# Patient Record
Sex: MALE | Employment: FULL TIME | URBAN - NONMETROPOLITAN AREA
[De-identification: names, ages, dates, MRNs, and addresses within clinical notes are randomized per-mention and may not be internally consistent; named-entity substitution may affect disease eponyms.]

---

## 2021-07-15 PROBLEM — M17.11 PRIMARY OSTEOARTHRITIS OF RIGHT KNEE: Status: ACTIVE | Noted: 2020-06-29

## 2021-07-15 PROBLEM — K21.9 GASTROESOPHAGEAL REFLUX DISEASE WITHOUT ESOPHAGITIS: Status: ACTIVE | Noted: 2020-06-30

## 2021-09-02 ENCOUNTER — HOSPITAL ENCOUNTER (OUTPATIENT)
Dept: NEUROLOGY | Age: 56
Discharge: HOME | End: 2021-09-02
Payer: COMMERCIAL

## 2021-09-02 DIAGNOSIS — M25.78 OSTEOPHYTE OF CERVICAL SPINE: ICD-10-CM

## 2021-09-02 PROCEDURE — 95912 NRV CNDJ TEST 11-12 STUDIES: CPT

## 2021-09-02 PROCEDURE — 95886 MUSC TEST DONE W/N TEST COMP: CPT

## 2021-09-02 NOTE — PROGRESS NOTES
EMG/NCS Bilateral    upper Completed    PCP: PAVITHRA Wong - CNP    Ordering:Aurea Angulo APRN-CNP    Interpreting Physician: Annita Manning MD    Technician: Layne Luna RN, RN

## 2021-09-02 NOTE — PROCEDURES
Elizabet Ortega is a 60 y.o. male patient. 1. Osteophyte of cervical spine      Past Medical History:   Diagnosis Date    Seizures ([address]      There were no vitals taken for this visit. Procedures   Electromyography/ Nerve Conduction Study  (EMG/NCV)    Patient Name: Elizabet Ortega   MRN: [id number]  Date of Procedure: 9/2/2021 . Procedure:  Bilateral Upper Extremity EMG /NCV    Chief Complaint::Bilateral  Hand  numbness    Elizabet Ortega is a 60 y.o.  male  Referred  By PAVITHRA Herron CNP   for electrodiagnostic medicine consultation. Patient complains of Bilateral hand  Numbness and weakness . Patient has pain and numbness and lack of feeling in Bilateral  hands  This been going on for about 12 months without any specific injury. Patient has abnormal  sensation in the  Bilateral fingers  Digits 1-4 and slightly  Left 5th finger . Pain does not radiate . There is not history of fractures    Patient does not have Diabetes Mellitus. Patient does not have neck pain, left arm pain nor current pain or weakness in legs. There are new XRays   Cervical XRays ? Normal    Cervical MRI has been  Ordered     Lab     (suggestion)  Information displayed in this report will not trend and will not trigger automated decision support. Ref Range & Units Value   Sodium 134 - 146 mmol/L 144        Potassium, Bld 3.5 - 5.0 mmol/L 4.3        Chloride 98 - 109 mmol/L 109        CO2 22 - 32 mmol/L 27        Anion gap 5 - 15 mmol/L 8    Comment: NEW REFERENCE RANGE       Resulting Agency               Pain Scale:  Pain and numbness reported as 4 out of 10. His present pain began 12 months ago . Pain has not changed since onset. Pain is associated with:numbness     Patient does report numbness. Patient does not report tingling. There is  associated weakness.    Difficulty controlling bowels: No.  Difficulty controlling bladder: No.  Electrical shock sensation in her legs: No.  Difficulty with balance while standing or walking: No.    Patient reports that topical had hadn splints   Worn  Last nightr and Vince  Carpal tunnel injections  Performed  Dr. Ronny Nair, [facility] [place], [place] , yesterday and  States  Left hadn especially is less painful and  num help to alleviate the pain. The following changes pain for the worse: overhead work . The following treatment has been tried:  Physical therapy: No.   Chiropractic treatment: No.  Epidural steroid injection/block: No.   Prescription medications: No.   Over-the-counter medications: Yes. Past Medical History:  Past Medical History:   Diagnosis Date    Seizures ([facility] [address]         Past Surgical History  Past Surgical History:   Procedure Laterality Date    ANTERIOR CRUCIATE LIGAMENT REPAIR      BACK SURGERY      JOINT REPLACEMENT      ROTATOR CUFF REPAIR Bilateral          Family Medical History :  No family history on file. Social History   Social History     Socioeconomic History    Marital status: Unknown     Spouse name: Not on file    Number of children: Not on file    Years of education: Not on file    Highest education level: Not on file   Occupational History    Not on file   Tobacco Use    Smoking status: Never Smoker    Smokeless tobacco: Never Used   Substance and Sexual Activity    Alcohol use: Yes     Comment: occassionally    Drug use: Never    Sexual activity: Not on file   Other Topics Concern    Not on file   Social History Narrative    Not on file     Social Determinants of Health     Financial Resource Strain:     Difficulty of Paying Living Expenses:    Food Insecurity:     Worried About Running Out of Food in the Last Year:     [address] in the Last Year:    Transportation Needs:     Lack of Transportation (Medical):      Lack of Transportation (Non-Medical):    Physical Activity:     Days of Exercise per Week:     Minutes of Exercise per Session:    Stress:     Feeling of Stress :    Social Connections:     Frequency of Communication with Friends and Family:     Frequency of Social Gatherings with Friends and Family:     Attends Faith Services:     Active Member of Clubs or Organizations:     Attends Club or Organization Meetings:     Marital Status:    Intimate Partner Violence:     Fear of Current or Ex-Partner:     Emotionally Abused:     Physically Abused:     Sexually Abused:          Review of Systems  Allergies   Allergen Reactions    Oxycodone-Acetaminophen Other (See Comments) and Rash     Doesn't agree with him. Possible stomach ulcer from it    Codeine Other (See Comments)     Tylenol # 3 doesn't agree with him. Upset stomach     No current outpatient medications on file prior to encounter. No current facility-administered medications on file prior to encounter. Constitutional- no chills fever fatigue  HENT: no congestion   Eyes: no discharge itching  Respiratory: Negative for choking chest tightness, short of breath  Gastrointestinal: no nausea vomiting  Abdominal pain  Musculoskeletal: negative for arthralgias back pain currently  Behavioral: mild anxiety    Physical Exam:   General Appearance:  alert, well appearing, and in no acute distress  Mental status: oriented to person, place, and time with normal affect  Head:  normocephalic, atraumatic. Eye: no icterus, redness, pupils equal and reactive, extraocular eye movements intact, conjunctiva clear  Ear: normal external ear, no discharge, hearing intact  Nose:  no drainage noted  Mouth: mucous membranes moist  Neck: supple, no carotid bruits, thyroid not palpable  Lungs: Bilateral equal air entry, clear to ausculation, no wheezing, rales or rhonchi, normal effort  Cardiovascular: normal rate, regular rhythm, no murmur, gallop, rub.   Abdomen: Soft, nontender, nondistended, normal bowel sounds, no hepatomegaly or splenomegaly  Neurologic: normal muscle tone and bulk, sensory deficits   Mild  Dysesthesias finger tips  1-4 Bilaterally    Strength   Right Handed  $+/5  Left 4+/5  Tender at biceps  Insertion  Painful flexion 4+/5  Mild left Tinels at  Ulnar groove   Neck FROM  Shoulders FROM ,                                                      normal speech, cranial nerves II through XII grossly intact  Musculoskeletal:  No bony deformities  Skin: No gross lesions, rashes, bruising or bleeding on exposed skin area  Extremities:  peripheral pulses palpable, no pedal edema or calf pain with palpation  Psych: normal affect     EMG/NCV Findings:  1) Left  Median Motor Nerve Conduction Distal Latency (DL) is   4.85 ms prolonged compared to  Left Ulnar Sensory Nerve Conduction  DL , normal is less than 1 ms                                       2) Left Median Sensory Nerve Conduction  DL  -  No response at digit 2, and  At digit  V  0,8  ms prolonged compared with Left ulnar sensory nerve conduction  distal latency at digit V      , normal is .5 ms or less                                           3)  Right Median Motor nerve conduction  DL is 4.0 ms prolonged compared with  Right  Ulnar Motor Nerve Conduction, normal is  Less than  1 ms. 4) Right Median Sensory Nerve Conduction DL is absent at digit 2                                           5)  Right  Ulnar Motor  Nerve velocity is slowed 9m/s  Abnormal is slowing 10 m/s or greater                                         6) Left Ulnar Motor Nerve velocity is slowed 9 m/s.  Abnormal is slowing 10 m/s or greater     No cervical Radiculopathy Bilaterally  No Brachial Plexopathy Bilaterally  No other mononeuropathy nor peripheral neuropathy  No evidence of axonal loss                                     Impression:   ABNORMAL STUDIES                          1  Left Carpal Tunnel syndrome affecting  Motor fibers to a Severe Degree                                                                                 affecting Sensory fibers to   Severe degree                            2) Right Carpal Tunnel  Syndrome affecting Motor fibers to a Severe degree                                                                                        Sensory fibers to  A Severe degree                          3 ) Borderline  Left  Ulnar Neuropathy  At Elbow (UNE) ,  Cubital tunnel syndrome                                                   4) Borderline Right  Ulnar Neuropathy At Elbow (UNE) , Cubital tunnel Syndrome               TABLES      Findings of Nerve Conductions: Please see NCV table      Impression: 1 Left  Carpal Tunnel Syndrome                       2) Right Carpal Tunnel Syndrome                      3) Borderline Right Cubital Tunnel Syndrome                      4) Borderline Left  Cubital Tunnel Syndrome                      5) Recent Right Distal Biceps Tendon Tear at  Elbow                      6) History  Of Bilateral Rotator Cuff  Repairs 2016? 7) History of Lumbar Fusion 2008                               Plan: Will  Send Results to  Dr. Valentina Bains, NP in [facility]   Patient told me  He was seen  Yesterday  By  Dr. Ankit Morales[address]  [place], [place]  Gave Bilateral Carpal Ligament Injections and started a on night  Splints and patient has  Less Bilateral Hand Numbness today       Recommend  Neuro or Orthopedic Surgery  Referral ( Dr. Belinda Diamond already has seen patient )  for  Evaluation for     Bilateral Carpal Tunnel Releases           Electronically signed by Lucía Bah MD on 9/2/2021 at 3:13 PM     Lucía Bah MD  9/2/2021 Hide Additional Notes?: No Detail Level: Zone

## 2021-09-02 NOTE — PROCEDURES
EMG Nerve Conduction Study    Patient Name: Mariely Welsh   MRN: [id number]  Date of Procedure: 9/2/2021    Procedure: Bilateral Upper Extremity EMG    . EMG Summary Table     Spontaneous MUAP Recruitment    IA Fib PSW Fasc H.F. Amp Dur. PPP Pattern   R. FIRST D INTEROSS N None None None None N N N N   .                        R. Abd [address] None None None None N N few N   R. Abd Dig Min N None None None None N N N N   R. BICEPS N None None None None N N N N   R. DELTOID N None None None None N N N N   R. TRICEPS N None None None None N N N N   R. CERV PSP (U) N None None None None       R. CERV PSP (M) N None None None None       R. CERV PSP (L) N None None None None       R. Flexor Dig Longus N None None None None       . R. Extensor Dig. Radialis N None None None None       R. Extensor Dig. Ulnaris N None None None None             EMG Summary Table     Spontaneous MUAP Recruitment    IA Fib PSW Fasc H.F. Amp Dur. PPP Pattern   L. FIRST D INTEROSS N None None None None N N N N   .                        L. Abd Poll Brev N None None None None   Few     L. Abd Dig Min N None None None None       L. BICEPS N None None None None N N N N   L. DELTOID N None None None None N N N N   L. TRICEPS N None None None None N N N N   L. CERV PSP (U) N None None None        L. CERV PSP (M) N None None None        L. CERV PSP (L) N None None None        L. Flexor Dig Longus N None None None        . L. Extensor Dig. Radialis N None None None        R. Extensor Dig. Ulnaris N None None None          EMG Summary Table     Spontaneous MUAP Recruitment   .   Electronically signed by Maxwell Andres MD on 9/2/2021 at 3:54 PM

## 2022-09-09 PROBLEM — M25.512 CHRONIC PAIN OF BOTH SHOULDERS: Status: ACTIVE | Noted: 2022-09-09

## 2022-09-09 PROBLEM — M25.511 CHRONIC PAIN OF BOTH SHOULDERS: Status: ACTIVE | Noted: 2022-09-09

## 2022-09-09 PROBLEM — G89.29 CHRONIC PAIN OF BOTH SHOULDERS: Status: ACTIVE | Noted: 2022-09-09

## 2024-12-18 ENCOUNTER — HOSPITAL ENCOUNTER (OUTPATIENT)
Dept: PHYSICAL THERAPY | Age: 59
Setting detail: THERAPIES SERIES
Discharge: HOME | End: 2024-12-18
Payer: COMMERCIAL

## 2024-12-18 PROCEDURE — 97161 PT EVAL LOW COMPLEX 20 MIN: CPT

## 2024-12-18 ASSESSMENT — PAIN DESCRIPTION - PAIN TYPE: TYPE: SURGICAL PAIN

## 2024-12-18 ASSESSMENT — PAIN DESCRIPTION - DESCRIPTORS: DESCRIPTORS: ACHING

## 2024-12-18 ASSESSMENT — PAIN DESCRIPTION - ORIENTATION: ORIENTATION: RIGHT

## 2024-12-18 ASSESSMENT — PAIN SCALES - GENERAL: PAINLEVEL_OUTOF10: 0

## 2024-12-18 ASSESSMENT — PAIN DESCRIPTION - LOCATION: LOCATION: ELBOW

## 2024-12-18 NOTE — PLAN OF CARE
Potential: [] Excellent [x] Good [] Fair  [] Poor     Electronically signed by:  Sid Montoya PT    If you have any questions or concerns, please don't hesitate to call.  Thank you for your referral.      Physician Signature:________________________________Date:__________________  By signing above, therapist’s plan is approved by physician

## 2024-12-18 NOTE — PROGRESS NOTES
Physical Therapy  Initial Assessment  Date: 2024  Patient Name: Manuel Woody  MRN: [id number]  : 1965    Referring Physician: Daniel Farris,*     PCP: Aurea Muhammad APRN - CNP     Medical Diagnosis: Strain of muscle, fascia and tendon of long head of biceps, right arm, initial encounter [S46.111A]    No data recorded    Insurance: Payor: [facility] / Plan: [facility] / Product Type: Indemnity /   Insurance ID: [id number] - ([facility])      Subjective:  General  Chart Reviewed: Yes  Patient Assessed for Rehabilitation Services: Yes  Subjective  Subjective: Patient reporting pulling a gear box and other person pulled it.  Patient noting felt a pop in elbow and shoudler.  Patient had surgery approx 6 weeks ago.  Pain Screening  Patient Currently in Pain: Yes  Pain Assessment: 0-10  Pain Level: 0  Best Pain Level: 0  Worst Pain Level:  (5-10/10 at worst for shoulder, 5/10 elbow)  Pain Type: Surgical pain  Pain Location: Elbow  Pain Orientation: Right  Pain Descriptors: Aching       Vision/Hearing:  Vision  Vision: Within Functional Limits  Hearing  Hearing: Within functional limits    Orientation:  Orientation  Overall Orientation Status: Within Functional Limits      Functional Status:  Functional Status  Occupation: Off due to injury  Type of Occupation: --climbing/lifting/prying/heavy tools.  Prior Level of Assist for ADLs: Independent  Prior Level of Assist for Homemaking: Independent    Objective:          AROM RUE (degrees)  RUE AROM : Exceptions  R Elbow Flexion (0-145): 124  R Elbow Extension (145-0): lack 21  R Forearm Pron (0-90): 88  R Forearm Supination  (0-90): 68  AROM LUE (degrees)  LUE AROM : Exceptions  L Elbow Flexion (0-145): 136  L Elbow Extension (145-0): 0  L Forearm Pron (0-90): 90  L Forearm Supination  (0-90): 80    Strength RUE  Strength RUE: Exception  R Elbow Flexion: 4/5  R Elbow Extension: 4+/5;5/5  R Forearm Pron: 5/5  R Forearm

## 2024-12-18 NOTE — FLOWSHEET NOTE
skill, proprioception.  (04802)    Manual Treatments:    [] Provided manual therapy to mobilize soft tissue/joints for the purpose of modulating pain, promoting relaxation,  increasing ROM, reducing/eliminating soft tissue swelling/inflammation/restriction, improving soft tissue extensibility. (42099)    Service Based Modalities:      Timed Code Treatment Minutes:       Total Treatment Minutes:   40    Treatment/Activity Tolerance:  [x] Patient tolerated treatment well [] Patient limited by fatique  [] Patient limited by pain  [] Patient limited by other medical complications  [] Other:     Prognosis: [x] Good [] Fair  [] Poor    Patient Requires Follow-up: [x] Yes  [] No      Goals:  Short Term Goals  Time Frame for Short Term Goals: 3 weeks  Short Term Goal 1: Initiate HEP    Long Term Goals  Time Frame for Long Term Goals : 6 weeks  Long Term Goal 1: Independent in HEP  Long Term Goal 2: Improve elbow strength to 5/5 to allow ease with strengthening.  Long Term Goal 3: Improve elbow AROM flexion to 130, ext to lack 5  Long Term Goal 4: Improve UEFI to 60/80 or greater to improve overall strength and pain reduction.          Plan:   [] Continue per plan of care [] Alter current plan (see comments)  [x] Plan of care initiated [] Hold pending MD visit [] Discharge  Plan for Next Session:      Electronically signed by:  Sid Montoya PT

## 2024-12-19 ENCOUNTER — HOSPITAL ENCOUNTER (OUTPATIENT)
Dept: PHYSICAL THERAPY | Age: 59
Setting detail: THERAPIES SERIES
Discharge: HOME | End: 2024-12-19
Payer: COMMERCIAL

## 2024-12-19 PROCEDURE — 97110 THERAPEUTIC EXERCISES: CPT | Performed by: PHYSICAL THERAPY ASSISTANT

## 2024-12-19 NOTE — FLOWSHEET NOTE
Physical Therapy Daily Treatment Note    Date:  2024    Patient Name:  Manuel Woody    :  1965  MRN: [id number]  Restrictions/Precautions:   R RTC tear   Medical/Treatment Diagnosis Information:    Strain of muscle, fascia and tendon of long head of biceps, right arm, initial encounter [S46.111A]       Insurance/Certification information:   [facility]   Physician Information:   Daniel Farris MD  Plan of care signed (Y/N):  n  Visit# / total visits:  1/10  Pain level: /10       Time In:348   Time Out:412    Progress Note: []  Yes  [x]  No  Next due by: Visit #10      Subjective:   No pain noted in shoulder or elbow when at rest. Note receiving cortizone shot in shoulder this date. Received ok to perform elbow strengthening    Objective: RAÚL complete per flow chart to facilitate strength, motion and stability. Initiated multiple exercises to improve wrist and elbow strength for work related duties. Patient given squeeze ball for HEP. Shoulder pain noted intermittently throughout session with movement. Discussed attempting to minimize shoulder movement during exercises. Understanding noted.     Observation:   Test measurements:      Exercises:   Exercise/Equipment Resistance/Repetitions Other comments        Elbow AROM flexion/ext  10x 2#     Elbow ext stretch      Wrist flexion/ext  10x    Wrist supination/pronation  10x Hammer   Ball Squeeze 2'     Wrist Bar 10x ea RED (Sup/Pro/Flex/Ext)   Gripper 10x Rubber bands   Triceps Kick out 10x 2# Watch shoulder pain                             [x] Provided verbal/tactile cueing for activities related to strengthening, flexibility, endurance, ROM. (96227)  [] Provided verbal/tactile cueing for activities related to improving balance, coordination, kinesthetic sense, posture, motor skill, proprioception. (18295)    Therapeutic Activities:     [] Therapeutic activities, direct (one-on-one) patient contact (use of dynamic activities to improve

## 2024-12-23 ENCOUNTER — HOSPITAL ENCOUNTER (OUTPATIENT)
Dept: PHYSICAL THERAPY | Age: 59
Setting detail: THERAPIES SERIES
Discharge: HOME | End: 2024-12-23
Payer: COMMERCIAL

## 2024-12-23 PROCEDURE — 97110 THERAPEUTIC EXERCISES: CPT

## 2024-12-23 NOTE — FLOWSHEET NOTE
Physical Therapy Daily Treatment Note    Date:  2024    Patient Name:  Manuel Woody    :  1965  MRN: [id number]  Restrictions/Precautions:   R RTC tear   Medical/Treatment Diagnosis Information:    Strain of muscle, fascia and tendon of long head of biceps, right arm, initial encounter [S46.111A]       Insurance/Certification information:   [facility]   Physician Information:   Daniel Farris MD  Plan of care signed (Y/N):  n  Visit# / total visits:  3/10 - updated   Pain level: /10       Time In:900  Time Out:915    Progress Note: []  Yes  [x]  No  Next due by: Visit #10      Subjective: Reports no pain with exercises, just soreness after last session. Pain in forearm vs elbow with exercises. Noticing relief from Cortizone shot last week.     Objective: RAÚL complete per flow chart to facilitate strength, motion and stability. Initiated multiple exercises to improve wrist and elbow strength for work related duties. Shoulder pain noted intermittently throughout session with movement. Discussed attempting to minimize shoulder movement during exercises. Understanding noted.     Observation:   Test measurements:      Exercises:   Exercise/Equipment Resistance/Repetitions Other comments        Elbow AROM flexion/ext  10x 2#     Elbow ext stretch      Wrist flexion/ext  10x    Wrist supination/pronation  10x Hammer   Ball Squeeze 2'     Wrist Bar 10x ea RED (Sup/Pro/Flex/Ext)   Gripper 15x Rubber bands   Triceps Kick out 10x 2# Watch shoulder pain                             [x] Provided verbal/tactile cueing for activities related to strengthening, flexibility, endurance, ROM. (41343)  [] Provided verbal/tactile cueing for activities related to improving balance, coordination, kinesthetic sense, posture, motor skill, proprioception. (66105)    Therapeutic Activities:     [] Therapeutic activities, direct (one-on-one) patient contact (use of dynamic activities to improve functional

## 2024-12-26 ENCOUNTER — HOSPITAL ENCOUNTER (OUTPATIENT)
Dept: PHYSICAL THERAPY | Age: 59
Setting detail: THERAPIES SERIES
Discharge: HOME | End: 2024-12-26
Payer: COMMERCIAL

## 2024-12-26 PROCEDURE — 97110 THERAPEUTIC EXERCISES: CPT | Performed by: PHYSICAL THERAPY ASSISTANT

## 2024-12-26 NOTE — FLOWSHEET NOTE
Physical Therapy Daily Treatment Note    Date:  2024    Patient Name:  Manuel Woody    :  1965  MRN: [id number]  Restrictions/Precautions:   R RTC tear   Medical/Treatment Diagnosis Information:    Strain of muscle, fascia and tendon of long head of biceps, right arm, initial encounter [S46.111A]       Insurance/Certification information:   [facility]   Physician Information:   Daniel Farris MD  Plan of care signed (Y/N):  n  Visit# / total visits:  4/10 - updated   Pain level: /10       Time In:09  Time Out:1025    Progress Note: []  Yes  [x]  No  Next due by: Visit #10      Subjective: No pain in elbow, ongoing pain in shoulder noted. Notes feeling overall improvement in elbow.     Objective: RAÚL complete per flow chart to facilitate strength, motion and stability. Initiated multiple exercises to improve wrist and elbow strength for work related duties. Shoulder pain noted intermittently throughout session with movement. Discussed attempting to minimize shoulder movement during exercises. Understanding noted.     Observation:   Test measurements:  °    Exercises:   Exercise/Equipment Resistance/Repetitions Other comments        Elbow Ext     Elbow AROM flexion 10x 2#  3-way   Elbow ext stretch      Wrist flexion/ext  10x    Wrist supination/pronation  10x Hammer   Ball Squeeze 2'     Wrist Bar 15x ea RED (Sup/Pro/Flex/Ext)   Gripper 15x Rubber bands   Triceps Kick out 10x 2# Watch shoulder pain                   PROM Flex/Ext 8'         [x] Provided verbal/tactile cueing for activities related to strengthening, flexibility, endurance, ROM. (87713)  [] Provided verbal/tactile cueing for activities related to improving balance, coordination, kinesthetic sense, posture, motor skill, proprioception. (32496)    Therapeutic Activities:     [] Therapeutic activities, direct (one-on-one) patient contact (use of dynamic activities to improve functional performance). (81360)    Gait:

## 2024-12-27 ENCOUNTER — HOSPITAL ENCOUNTER (OUTPATIENT)
Dept: PHYSICAL THERAPY | Age: 59
Setting detail: THERAPIES SERIES
Discharge: HOME | End: 2024-12-27
Payer: COMMERCIAL

## 2024-12-27 PROCEDURE — 97110 THERAPEUTIC EXERCISES: CPT | Performed by: PHYSICAL THERAPY ASSISTANT

## 2024-12-27 NOTE — FLOWSHEET NOTE
Physical Therapy Daily Treatment Note    Date:  2024    Patient Name:  Manuel Woody    :  1965  MRN: [id number]  Restrictions/Precautions:   R RTC tear   Medical/Treatment Diagnosis Information:    Strain of muscle, fascia and tendon of long head of biceps, right arm, initial encounter [S46.111A]       Insurance/Certification information:   [facility]   Physician Information:   Daniel Farris MD  Plan of care signed (Y/N):  n  Visit# / total visits:  4/10 - updated   Pain level: /10       Time In:1005  Time Out:1033    Progress Note: []  Yes  [x]  No  Next due by: Visit #10      Subjective: No pain in elbow, ongoing pain in shoulder noted. Soreness through forearm noted.     Objective: RAÚL complete per flow chart to facilitate strength, motion and stability. Verbal cuing for proper technique and order of exercises. Manual PROM to elbow flexion and extension to improve motion. Tight end range noted. Shoulder pain noted intermittently throughout session with movement.     Observation:   Test measurements:      Exercises:   Exercise/Equipment Resistance/Repetitions Other comments        Elbow Ext     Elbow AROM flexion 10x 2#  3-way   Elbow ext stretch      Wrist flexion/ext  10x    Wrist supination/pronation  15x Hammer   Ball Squeeze 2'     Wrist Bar 15x ea RED (Sup/Pro/Flex/Ext)   Gripper 15x Rubber bands   Triceps Kick out 10x 2# Watch shoulder pain                   PROM Flex/Ext 8'         [x] Provided verbal/tactile cueing for activities related to strengthening, flexibility, endurance, ROM. (11602)  [] Provided verbal/tactile cueing for activities related to improving balance, coordination, kinesthetic sense, posture, motor skill, proprioception. (95096)    Therapeutic Activities:     [] Therapeutic activities, direct (one-on-one) patient contact (use of dynamic activities to improve functional performance). (83897)    Gait:   [] Provided training and instruction to the patient

## 2024-12-30 ENCOUNTER — HOSPITAL ENCOUNTER (OUTPATIENT)
Dept: PHYSICAL THERAPY | Age: 59
Setting detail: THERAPIES SERIES
Discharge: HOME | End: 2024-12-30
Payer: COMMERCIAL

## 2024-12-30 PROCEDURE — 97110 THERAPEUTIC EXERCISES: CPT

## 2024-12-30 NOTE — FLOWSHEET NOTE
Physical Therapy Daily Treatment Note    Date:  2024    Patient Name:  Manuel Woody    :  1965  MRN: [id number]  Restrictions/Precautions:   R RTC tear   Medical/Treatment Diagnosis Information:    Strain of muscle, fascia and tendon of long head of biceps, right arm, initial encounter [S46.111A]       Insurance/Certification information:   [facility]   Physician Information:   Daniel Farris MD  Plan of care signed (Y/N):  n  Visit# / total visits:  5/10 - updated   Pain level: /10       Time In:     1:16  Time Out:    1:44    Progress Note: []  Yes  [x]  No  Next due by: Visit #10      Subjective: No pain in elbow, ongoing pain in shoulder with certain movements. Feels a catch in his shoulder at times that \"feels like muscles rubbing against each other\".    Objective: RAÚL complete per flow chart to facilitate strength, motion and stability. Verbal cuing for proper technique and order of exercises. Manual PROM to elbow flexion and extension to improve motion. Tight end range noted. Shoulder pain noted intermittently throughout session with movement.     Observation:   Test measurements:      Exercises:   Exercise/Equipment Resistance/Repetitions Other comments        Elbow Ext     Elbow AROM flexion 10x 2#  3-way   Elbow ext stretch      Wrist flex/ext  10x    Wrist supination/pronation  15x Hammer   Ball Squeeze 2'     Wrist Bar 15x ea RED (Sup/Pro/Flex/Ext)   Gripper 15x Rubber bands   Triceps Kick out 10x 2# Watch shoulder pain                   PROM Flex/Ext 8'         [x] Provided verbal/tactile cueing for activities related to strengthening, flexibility, endurance, ROM. (01029)  [] Provided verbal/tactile cueing for activities related to improving balance, coordination, kinesthetic sense, posture, motor skill, proprioception. (43117)    Therapeutic Activities:     [] Therapeutic activities, direct (one-on-one) patient contact (use of dynamic activities to improve functional

## 2025-01-03 ENCOUNTER — HOSPITAL ENCOUNTER (OUTPATIENT)
Dept: PHYSICAL THERAPY | Age: 60
Setting detail: THERAPIES SERIES
Discharge: HOME | End: 2025-01-03
Payer: COMMERCIAL

## 2025-01-03 PROCEDURE — 97110 THERAPEUTIC EXERCISES: CPT | Performed by: PHYSICAL THERAPY ASSISTANT

## 2025-01-03 NOTE — FLOWSHEET NOTE
Physical Therapy Daily Treatment Note    Date:  1/3/2025    Patient Name:  Manuel Woody    :  1965  MRN: [id number]  Restrictions/Precautions:   R RTC tear   Medical/Treatment Diagnosis Information:    Strain of muscle, fascia and tendon of long head of biceps, right arm, initial encounter [S46.111A]       Insurance/Certification information:   [facility]   Physician Information:   Daniel Farris MD  Plan of care signed (Y/N):  n  Visit# / total visits:  7/10 -   Pain level: /10       Time In:  1006  Time Out:   1033    Progress Note: []  Yes  [x]  No  Next due by: Visit #10  2025    Subjective: No pain noted in elbow.    Objective: RAÚL complete per flow chart to facilitate strength, motion and stability. Verbal cuing for proper technique and order of exercises. Manual PROM to elbow flexion and extension to improve motion. Tight end range noted. Shoulder pain noted intermittently throughout session with movement.     Observation:   Test measurements:  AROM   AAROM 5 elbow ext    Exercises:   Exercise/Equipment Resistance/Repetitions Other comments        Elbow Ext     Elbow AROM flexion 15x 2#  3-way   Elbow ext stretch      Wrist flex/ext  10x 2#   Wrist supination/pronation  15x Hammer   Ball Squeeze 2'     Wrist Bar 15x ea RED (Sup/Pro/Flex/Ext)   Gripper 20x Rubber bands   Triceps Kick out 10x 2# Watch shoulder pain                   PROM Flex/Ext 8'         [x] Provided verbal/tactile cueing for activities related to strengthening, flexibility, endurance, ROM. (82797)  [] Provided verbal/tactile cueing for activities related to improving balance, coordination, kinesthetic sense, posture, motor skill, proprioception. (84256)    Therapeutic Activities:     [] Therapeutic activities, direct (one-on-one) patient contact (use of dynamic activities to improve functional performance). (17276)    Gait:   [] Provided training and instruction to the patient for ambulation

## 2025-01-06 ENCOUNTER — HOSPITAL ENCOUNTER (OUTPATIENT)
Dept: PHYSICAL THERAPY | Age: 60
Setting detail: THERAPIES SERIES
Discharge: HOME | End: 2025-01-06
Payer: COMMERCIAL

## 2025-01-06 PROCEDURE — 97110 THERAPEUTIC EXERCISES: CPT

## 2025-01-08 ENCOUNTER — APPOINTMENT (OUTPATIENT)
Dept: PHYSICAL THERAPY | Age: 60
End: 2025-01-08
Payer: COMMERCIAL

## 2025-01-10 ENCOUNTER — HOSPITAL ENCOUNTER (OUTPATIENT)
Dept: PHYSICAL THERAPY | Age: 60
Setting detail: THERAPIES SERIES
Discharge: HOME | End: 2025-01-10
Payer: COMMERCIAL

## 2025-01-10 PROCEDURE — 97161 PT EVAL LOW COMPLEX 20 MIN: CPT

## 2025-01-10 ASSESSMENT — PAIN DESCRIPTION - DESCRIPTORS: DESCRIPTORS: SHARP

## 2025-01-10 ASSESSMENT — PAIN DESCRIPTION - LOCATION: LOCATION: SHOULDER

## 2025-01-10 ASSESSMENT — PAIN DESCRIPTION - ORIENTATION: ORIENTATION: RIGHT

## 2025-01-10 ASSESSMENT — PAIN SCALES - GENERAL: PAINLEVEL_OUTOF10: 0

## 2025-01-10 NOTE — FLOWSHEET NOTE
Physical Therapy Daily Treatment Note    Date:  1/10/2025    Patient Name:  Manuel Woody    :  1965  MRN: [id number]  Restrictions/Precautions:     Medical/Treatment Diagnosis Information:    Strain of muscle, fascia and tendon of long head of biceps, right arm, initial encounter [S46.111A]  Other specified injuries of right shoulder and upper arm, initial encounter [S49.81XA]    Insurance/Certification information:   [facility]   Physician Information:    Taz Loya MD   Plan of care signed (Y/N):  n  Visit# / total visits: 1 /10  Pain level: 0/10, at worst 8/10       Time In: 1105  Time Out:1140    Progress Note: [x]  Yes  []  No  Next due by: Visit #10  Or by    Subjective:   see eval    Objective: see eval  Observation:   Test measurements:      Exercises: Follow Elbow Restrictions  Exercise/Equipment Resistance/Repetitions Other comments   Pulley's           Shoulder ext stretch      Shoulder IR stretch     4 way shoulder Band      Finger ladder      Wall slides     Table slides     Valpar      Shoulder ABC's      Shoulder Circles     Shoulder flexion/Abd      Supine pex stretch     SA punches/ABC     SL ER/abd     Prone cuff series          PROM IR        [] Provided verbal/tactile cueing for activities related to strengthening, flexibility, endurance, ROM. (33137)  [] Provided verbal/tactile cueing for activities related to improving balance, coordination, kinesthetic sense, posture, motor skill, proprioception. (93976)    Therapeutic Activities:     [] Therapeutic activities, direct (one-on-one) patient contact (use of dynamic activities to improve functional performance). (62017)    Gait:   [] Provided training and instruction to the patient for ambulation re-education. (22383)    Self-Care/ADL's  [] Self-care/home management training and compensatory training, meal preparation, safety procedures, and instructions in use of assistive technology devices/adaptive equipment, direct

## 2025-01-10 NOTE — PROGRESS NOTES
Physical Therapy  Initial Assessment  Date: 1/10/2025  Patient Name: Manuel Woody  MRN: [id number]  : 1965    Referring Physician: Taz Loya MD     PCP: Aurea Muhammad APRN - CNP     Medical Diagnosis: Strain of muscle, fascia and tendon of long head of biceps, right arm, initial encounter [S46.111A]  Other specified injuries of right shoulder and upper arm, initial encounter [S49.81XA]    No data recorded    Insurance: Payor: [place] [place] / Plan: [facility] / Product Type: Indemnity /   Insurance ID: [id number] - ([facility])    Subjective:  General  Chart Reviewed: Yes  Patient Assessed for Rehabilitation Services: Yes  General  General Comments: Patient noting reaching above and placing an item with increased pain.  Decreased AROM bakari wtih IR.  Subjective  Subjective: Patient reporting pulling a gear box and other person pulled it.  Patient noting felt a pop in elbow and shoudler.  Patient had Elbow surgery surgery 25.  [place]oulder injury at the same time.  MRI of the shoulder.  Tears in the Rotator Cuff.  Per patient account  States normally doesn't do surgery unless we have too.  Patient noting at last appt,  gave Cortisone injection, less pain and increased AROM.  PAtient noting decreased strength.  Pain Screening  Pain Assessment: 0-10  Pain Level: 0  Worst Pain Level: 8  Pain Location: Shoulder  Pain Orientation: Right  Pain Descriptors: Sharp       Vision/Hearing:  Vision  Vision: Within Functional Limits  Hearing  Hearing: Within functional limits    Functional Status:  Functional Status  Occupation: Off due to injury  Type of Occupation: --climbing/lifting/prying/heavy tools.  Prior Level of Assist for ADLs: Independent  Prior Level of Assist for Homemaking: Independent    Objective:          AROM RUE (degrees)  RUE AROM : Exceptions  R Shoulder Flexion (0-180): 139  R Shoulder Extension (0-45): 58  R Shoulder ABduction (0-180): 142  R Shoulder Int  Pt has hx of non compliance Recommend SLT OU R&B reviewed. Pt agrees to proceed.  Understands he will still continue Latanoprost.

## 2025-01-10 NOTE — PLAN OF CARE
days   [] 3 weeks [] 7 weeks     [] 4 days   [] 4 weeks [] 8 weeks    Rehab Potential: [] Excellent [x] Good [] Fair  [] Poor     Electronically signed by:  Sid Montoya PT    If you have any questions or concerns, please don't hesitate to call.  Thank you for your referral.      Physician Signature:________________________________Date:__________________  By signing above, therapist’s plan is approved by physician

## 2025-01-13 ENCOUNTER — HOSPITAL ENCOUNTER (OUTPATIENT)
Dept: PHYSICAL THERAPY | Age: 60
Setting detail: THERAPIES SERIES
Discharge: HOME | End: 2025-01-13
Payer: COMMERCIAL

## 2025-01-13 PROCEDURE — 97110 THERAPEUTIC EXERCISES: CPT

## 2025-01-13 NOTE — FLOWSHEET NOTE
Physical Therapy Daily Treatment Note    Date:  2025    Patient Name:  Manuel Woody    :  1965  MRN: [id number]  Restrictions/Precautions:     Medical/Treatment Diagnosis Information:    Strain of muscle, fascia and tendon of long head of biceps, right arm, initial encounter [S46.111A]  Other specified injuries of right shoulder and upper arm, initial encounter [S49.81XA]    Insurance/Certification information:   [facility]   Physician Information:    Taz Loya MD   Plan of care signed (Y/N):  n  Visit# / total visits: 2/10  Pain level: 0/10, at worst 8/10       Time In:    11:12  Time Out:   11:40    Progress Note: []  Yes  [x]  No  Next due by: Visit #10  Or by    Subjective:   Pt reports has no pain in R shoulder at rest. Pain increases to moderate when he flexes shoulder past shoulder height. Pain is worse when trying to lift something a little heavier above shoulder height.    Objective: There ex performed per flowsheet to increase R shoulder mobility and strength for improved ability to complete ADLs and to reduce pain. Verbal cueing for sequencing and proper form with ex. Pt noted mild pain with R shoulder flex and abd.     Observation:   Test measurements:      Exercises: Follow Elbow Restrictions  Exercise/Equipment Resistance/Repetitions Other comments   Pulley's  2' each Flex, scap        Shoulder ext stretch  30\"x2 wand   Shoulder IR stretch 30\"x2 strap   4 way shoulder Band  10x red    Finger ladder  5x each Flex, abd   Wall slides     Table slides     Valpar      Shoulder ABC's  1x Standing, at 90 deg flex   Shoulder Circles 10x    Shoulder flexion/Abd      Supine pex stretch     SA punches/ABC 10x/1x    SL ER/abd     Prone cuff series          PROM IR  5'      [] Provided verbal/tactile cueing for activities related to strengthening, flexibility, endurance, ROM. (19013)  [] Provided verbal/tactile cueing for activities related to improving balance, coordination, kinesthetic

## 2025-01-13 NOTE — FLOWSHEET NOTE
Physical Therapy Daily Treatment Note    Date:  2025    Patient Name:  Manuel Woody    :  1965  MRN: [id number]  Restrictions/Precautions:   R RTC tear   Medical/Treatment Diagnosis Information:    Strain of muscle, fascia and tendon of long head of biceps, right arm, initial encounter [S46.111A]       Insurance/Certification information:   [facility]   Physician Information:   Daniel Farris MD  Plan of care signed (Y/N):  n  Visit# / total visits:  9/10 -   Pain level: /10       Time In:       10:33  Time Out:       11:11    Progress Note: [x]  Yes  []  No  Next due by: Visit #10  2025    Subjective: No pain noted in elbow. Has some pain/discomfort across forearm.    Objective: RAÚL complete per flow chart to facilitate strength, motion and stability. Verbal cuing for proper technique and order of exercises. Manual PROM to elbow flexion and extension to improve motion. Tight end range noted with flexion. Advanced exercises for strengthening.    Observation:   Test measurements:  AROM 5-140 degrees  Flexion 4+/5, extension 5/5    Exercises:   Exercise/Equipment Resistance/Repetitions Other comments        Elbow Ext     Elbow AROM flexion 15x 4#  3-way   Elbow ext stretch      Wrist flex/ext  10x 3#    Wrist supination/pronation  20x Hammer   Ball Squeeze 2'     Wrist Bar 15x ea RED (Sup/Pro/Flex/Ext)   Gripper 20x Rubber bands   Triceps Kick out 10x 3# Watch shoulder pain    Wrist roller (1# on string) 2x each Flex, ext, 1# weight              PROM Flex/Ext 8'         [x] Provided verbal/tactile cueing for activities related to strengthening, flexibility, endurance, ROM. (08183)  [] Provided verbal/tactile cueing for activities related to improving balance, coordination, kinesthetic sense, posture, motor skill, proprioception. (86647)    Therapeutic Activities:     [] Therapeutic activities, direct (one-on-one) patient contact (use of dynamic activities to improve functional

## 2025-01-15 ENCOUNTER — APPOINTMENT (OUTPATIENT)
Dept: PHYSICAL THERAPY | Age: 60
End: 2025-01-15
Payer: COMMERCIAL

## 2025-01-15 ENCOUNTER — HOSPITAL ENCOUNTER (OUTPATIENT)
Dept: PHYSICAL THERAPY | Age: 60
Setting detail: THERAPIES SERIES
Discharge: HOME | End: 2025-01-15
Payer: COMMERCIAL

## 2025-01-15 PROCEDURE — 97110 THERAPEUTIC EXERCISES: CPT | Performed by: PHYSICAL THERAPY ASSISTANT

## 2025-01-15 NOTE — FLOWSHEET NOTE
Physical Therapy Daily Treatment Note    Date:  1/15/2025    Patient Name:  Manuel Woody    :  1965  MRN: [id number]  Restrictions/Precautions:     Medical/Treatment Diagnosis Information:    Strain of muscle, fascia and tendon of long head of biceps, right arm, initial encounter [S46.111A]  Other specified injuries of right shoulder and upper arm, initial encounter [S49.81XA]    Insurance/Certification information:   [facility]   Physician Information:    Taz Loya MD   Plan of care signed (Y/N):  n  Visit# / total visits: 2/10  Pain level: 0/10       Time In:    1140  Time Out:  1212    Progress Note: []  Yes  [x]  No  Next due by: Visit #10  Or by    Subjective:   Pt reports has no pain in R shoulder at rest. Notes discomfort in forearm only this date.     Objective: There ex performed per flowsheet to increase R shoulder mobility and strength for improved ability to complete ADLs and to reduce pain. Verbal cueing for sequencing and proper form with ex. Fatigue and discomfort noted in shoulder with prolonged activity. Initiated and advanced several exercises to improve motion and strength.     Observation:   Test measurements:      Exercises: Follow Elbow Restrictions  Exercise/Equipment Resistance/Repetitions Other comments   Pulley's  2' each Flex, scap        Shoulder ext stretch  30\"x2 wand   Shoulder IR stretch 30\"x2 strap   4 way shoulder Band  10x red    Finger ladder  5x each Flex, abd   Wall slides     Table slides     Valpar      Shoulder ABC's  1x Standing, at 90 deg flex   Shoulder Circles 10x    Shoulder flexion/Abd      Supine pex stretch     SA punches/ABC 10x/1x    SL ER/abd 10x ea 2#   Prone cuff series          PROM IR  5'      [] Provided verbal/tactile cueing for activities related to strengthening, flexibility, endurance, ROM. (71404)  [] Provided verbal/tactile cueing for activities related to improving balance, coordination, kinesthetic sense, posture, motor skill,

## 2025-01-16 ENCOUNTER — HOSPITAL ENCOUNTER (OUTPATIENT)
Dept: PHYSICAL THERAPY | Age: 60
Setting detail: THERAPIES SERIES
Discharge: HOME | End: 2025-01-16
Payer: COMMERCIAL

## 2025-01-16 PROCEDURE — 97110 THERAPEUTIC EXERCISES: CPT | Performed by: PHYSICAL THERAPY ASSISTANT

## 2025-01-16 PROCEDURE — 97110 THERAPEUTIC EXERCISES: CPT

## 2025-01-16 NOTE — FLOWSHEET NOTE
Physical Therapy Daily Treatment Note    Date:  2025    Patient Name:  Manuel Woody    :  1965  MRN: [id number]  Restrictions/Precautions:     Medical/Treatment Diagnosis Information:    Strain of muscle, fascia and tendon of long head of biceps, right arm, initial encounter [S46.111A]  Other specified injuries of right shoulder and upper arm, initial encounter [S49.81XA]    Insurance/Certification information:   [facility]   Physician Information:    Taz Loya MD   Plan of care signed (Y/N):  n  Visit# / total visits: 3/10  Pain level: 0/10       Time In:   1203  Time Out:1245    Progress Note: []  Yes  [x]  No  Next due by: Visit #10  Or by    Subjective:   Increased ease with donning shirts. Tightness and discomfort noted.     Objective: There ex performed per flowsheet to increase R shoulder mobility and strength for improved ability to complete ADLs and to reduce pain. Verbal cueing for sequencing and proper form with ex. Min fatigue and discomfort noted in shoulder with prolonged activity remain. Initiated and advanced several exercises to improve motion and strength.     Observation:   Test measurements:      Exercises: Follow Elbow Restrictions  Exercise/Equipment Resistance/Repetitions Other comments   Pulley's  2' each Flex, scap        Shoulder ext stretch  10x5'' wand   Shoulder IR stretch 10x10'' strap   4 way shoulder Band  15x red    Finger ladder  5x each Flex, abd   Counter push ups 10x Initiated        Wall slides     Table slides     Valpar      Shoulder ABC's  1x Standing, at 90 deg flex   Shoulder Circles 10x    Shoulder flexion/Abd      Supine pex stretch     SA punches/ABC 10x/1x 2#   SL ER/abd 10x ea 2#   Prone cuff series          PROM IR  5'      [x] Provided verbal/tactile cueing for activities related to strengthening, flexibility, endurance, ROM. (26703)  [] Provided verbal/tactile cueing for activities related to improving balance, coordination, kinesthetic

## 2025-01-16 NOTE — FLOWSHEET NOTE
Physical Therapy Daily Treatment Note    Date:  2025    Patient Name:  Manuel Woody    :  1965  MRN: [id number]  Restrictions/Precautions:   R RTC tear   Medical/Treatment Diagnosis Information:    Strain of muscle, fascia and tendon of long head of biceps, right arm, initial encounter [S46.111A]       Insurance/Certification information:   [facility]   Physician Information:   Daniel Farris MD  Plan of care signed (Y/N):  n  Visit# / total visits:  10/10 -   Pain level: /10       Time In:    1133  Time Out:    1203    Progress Note: [x]  Yes  []  No  Next due by: Visit #10  2025    Subjective: No pain noted in elbow. Has some pain/discomfort across forearm, with wrist extension.  Patient noting pushing up from the chair and can feel it difficulty with opening a jar.    Objective: RAÚL complete per flow chart to facilitate strength, motion and stability. Verbal cuing for proper technique and order of exercises. Manual PROM to elbow flexion and extension to improve motion. Tight end range noted with flexion. Advanced exercises for strengthening.    Observation:   Test measurements:  AROM 5-141 degrees  Flexion 4+/5, extension 5/5    Exercises:   Exercise/Equipment Resistance/Repetitions Other comments        Elbow Ext     Elbow AROM flexion 15x 4#  3-way   Elbow ext stretch      Wrist flex/ext  10x 3#    Wrist supination/pronation  readd Hammer   Ball Squeeze 2'     Wrist Bar 15x ea Blue (Sup/Pro/Flex/Ext)   Gripper readd Rubber bands   Triceps Kick out 15x 4# Watch shoulder pain    Wrist roller (4# on string) 2x each Flex, ext, 1# weight    Wall Pushups     Shoudler flexion-placement onto a shelf      PROM Flex/Ext 8'         [x] Provided verbal/tactile cueing for activities related to strengthening, flexibility, endurance, ROM. (45246)  [] Provided verbal/tactile cueing for activities related to improving balance, coordination, kinesthetic sense, posture, motor skill,

## 2025-01-16 NOTE — PLAN OF CARE
[facility]  Rehabilitation and Sports Medicine    [x] [place]  Phone: [phone]  Fax: [phone]      [] [place]  Phone: [phone]  Fax: [phone]    Physical Therapy Progress Note  Date: 2025        Patient Name:  Manuel Woody    :  1965  MRN: [id number]  Restrictions/Precautions:   R RTC tear   Medical/Treatment Diagnosis Information:    Strain of muscle, fascia and tendon of long head of biceps, right arm, initial encounter [S46.111A]      Insurance/Certification information:   [facility]   Physician Information:   Daniel Farris MD  Plan of care signed (Y/N):  n  Visit# / total visits:  10/10 -   Pain level:      /10      Time Period for Report:  24-25  Cancels/No-shows to date:  0    Plan of Care/Treatment to date:  [x] Therapeutic Exercise    [x] Modalities:  [x] Therapeutic Activity     [] Ultrasound  [] Electrical Stimulation  [] Gait Training      [] Cervical Traction    [] Lumbar Traction  [x] Neuromuscular Re-education  [] Cold/hotpack [] Iontophoresis  [x] Instruction in HEP      Other:  [x] Manual Therapy       []    [] Aquatic Therapy       []                           Subjective:   No pain noted in elbow. Has some pain/discomfort across forearm, with wrist extension.  Patient noting pushing up from the chair and can feel it difficulty with opening a jar.     Objective: RAÚL complete per flow chart to facilitate strength, motion and stability. Verbal cuing for proper technique and order of exercises. Manual PROM to elbow flexion and extension to improve motion. Tight end range noted with flexion. Advanced exercises for strengthening.     Observation:   Test measurements:  AROM 5-141 degrees  Flexion 4+/5, extension 5/5       Assessment:  Patient progressing towards all goals.  Shoulder limits him with most exs.  Patient now has an order for his R shoulder.  Progressing biceps and wrist in one session and shoulder session full

## 2025-01-20 ENCOUNTER — HOSPITAL ENCOUNTER (OUTPATIENT)
Dept: PHYSICAL THERAPY | Age: 60
Setting detail: THERAPIES SERIES
Discharge: HOME | End: 2025-01-20
Payer: COMMERCIAL

## 2025-01-20 PROCEDURE — 97110 THERAPEUTIC EXERCISES: CPT | Performed by: PHYSICAL THERAPY ASSISTANT

## 2025-01-20 NOTE — FLOWSHEET NOTE
Physical Therapy Daily Treatment Note    Date:  2025    Patient Name:  Manuel Woody    :  1965  MRN: [id number]  Restrictions/Precautions:   R RTC tear   Medical/Treatment Diagnosis Information:    Strain of muscle, fascia and tendon of long head of biceps, right arm, initial encounter [S46.111A]       Insurance/Certification information:   [facility]   Physician Information:   Daniel Farris MD  Plan of care signed (Y/N):  n  Visit# / total visits:  10/10 -   Pain level: /10       Time In: 1219  Time Out:   1050    Progress Note: []  Yes  []  No  Next due by: Visit #10  2025    Subjective: No pain noted in elbow. Has some pain/discomfort across forearm, with wrist extension.  Pain 0/10 at initiation of session, increases to 9/10 with wrist ext. Notes difficulty opening jars at home.   .   Objective: RAÚL complete per flow chart to facilitate strength, motion and stability. Verbal cuing for proper technique and order of exercises. Held / altered several exercises due to pain. Manual PROM to elbow flexion and extension to improve motion. Tight end range noted with flexion.     Observation:   Test measurements:   strength R/L: 75#/110#    Exercises:   Exercise/Equipment Resistance/Repetitions Other comments        Elbow Ext     Elbow AROM flexion 15x 4#  3-way   Elbow ext stretch      Wrist flex/ext  10x 3# Flexion only, pain with extension.    Wrist supination/pronation  20x   Hammer   Ball Squeeze 2'     Wrist Bar 10x ea Blue (Sup/Pro/Flex/)   Gripper 10x Rubber bands   Triceps Kick out 15x 4# Watch shoulder pain    Wrist roller (4# on string) 2x each Flex, ext, 1# weight              PROM Flex/Ext 8'         [x] Provided verbal/tactile cueing for activities related to strengthening, flexibility, endurance, ROM. (63458)  [] Provided verbal/tactile cueing for activities related to improving balance, coordination, kinesthetic sense, posture, motor skill, proprioception.

## 2025-01-20 NOTE — FLOWSHEET NOTE
Physical Therapy Daily Treatment Note    Date:  2025    Patient Name:  Manuel Woody    :  1965  MRN: [id number]  Restrictions/Precautions:     Medical/Treatment Diagnosis Information:    Strain of muscle, fascia and tendon of long head of biceps, right arm, initial encounter [S46.111A]  Other specified injuries of right shoulder and upper arm, initial encounter [S49.81XA]    Insurance/Certification information:   [facility]   Physician Information:    Taz Loya MD   Plan of care signed (Y/N):  n  Visit# / total visits: 4/10  Pain level: 0/10       Time In:   1050  Time Out:1130    Progress Note: []  Yes  [x]  No  Next due by: Visit #10  Or by    Subjective:   Increased ease with donning shirts. Tightness and discomfort noted. Increased pain when attempting to use drill overhead over weekend.     Objective: There ex performed per flowsheet to increase R shoulder mobility and strength for improved ability to complete ADLs and to reduce pain. Verbal cueing for sequencing and proper form with ex. Min fatigue and discomfort noted in shoulder with prolonged activity remain.     Observation: Anterior shoulder pain noted with prolonged use and stretching    Test measurements:      Exercises: Follow Elbow Restrictions  Exercise/Equipment Resistance/Repetitions Other comments   Pulley's  2' each Flex, scap        Shoulder ext stretch  10x5'' wand   Shoulder IR stretch 10x10'' strap   4 way shoulder Band  15x red    Finger ladder  5x each Flex, abd   Counter push ups 10x Initiated        Wall slides     Table slides     Valpar      Shoulder ABC's  1x Standing, at 90 deg flex   Shoulder Circles 10x    Shoulder flexion/Abd      Supine pex stretch     SA punches/ABC 15x/1x 2#   SL ER/abd 10x ea 2#   Prone cuff series          PROM IR        [x] Provided verbal/tactile cueing for activities related to strengthening, flexibility, endurance, ROM. (50904)  [] Provided verbal/tactile cueing for activities

## 2025-01-22 ENCOUNTER — HOSPITAL ENCOUNTER (OUTPATIENT)
Dept: PHYSICAL THERAPY | Age: 60
Setting detail: THERAPIES SERIES
Discharge: HOME | End: 2025-01-22
Payer: COMMERCIAL

## 2025-01-22 PROCEDURE — 97110 THERAPEUTIC EXERCISES: CPT | Performed by: PHYSICAL THERAPIST

## 2025-01-22 NOTE — FLOWSHEET NOTE
Physical Therapy Daily Treatment Note    Date:  2025    Patient Name:  Manuel Woody    :  1965  MRN: [id number]  Restrictions/Precautions:   R RTC tear   Medical/Treatment Diagnosis Information:    Strain of muscle, fascia and tendon of long head of biceps, right arm, initial encounter [S46.111A]       [facility]Cert[facility]   Physician Information:   Daniel Farris MD  Plan of care signed (Y/N):  n  Visit# / total visits:  2/10 of  POC  12 visits total  Pain level: /10       Time In: 11:02  Time Out:  11:38    Progress Note: []  Yes  []  No  Next due by: Visit #10  2025    Subjective: \"The shoulder itself hurts more than the elbow. I felt some increased pain while I was trying to put dishes away this morning. The elbow has very little pain this morning.\"  .   Objective: RAÚL complete per flow chart to facilitate strength, motion and stability. Verbal cuing for proper technique and order of exercises.  Manual PROM to elbow flexion and extension to improve motion. Tight end range noted with flexion.     Observation:   Test measurements:    AROM R elbow extension: 3°  flex: 140°    Exercises:   Exercise/Equipment Resistance/Repetitions Other comments        Elbow Ext     Elbow AROM flexion 20x 4#  3-way   Elbow ext stretch      Wrist flex/ext  10x 3# Flexion only, pain with extension.    Wrist supination/pronation  20x   Hammer   Ball Squeeze 2'     Wrist Bar 15x ea Blue (Sup/Pro/Flex/)   Gripper 15x Rubber bands   Triceps Kick out 15x 4# Watch shoulder pain    Wrist roller (1# on string) 5x each Flex, ext, 1# weight    Wrist Maze 3x         PROM Flex/Ext 6'         [x] Provided verbal/tactile cueing for activities related to strengthening, flexibility, endurance, ROM. (70948)  [] Provided verbal/tactile cueing for activities related to improving balance, coordination, kinesthetic sense, posture, motor skill, proprioception. (31862)    Therapeutic

## 2025-01-24 ENCOUNTER — HOSPITAL ENCOUNTER (OUTPATIENT)
Dept: PHYSICAL THERAPY | Age: 60
Setting detail: THERAPIES SERIES
Discharge: HOME | End: 2025-01-24
Payer: COMMERCIAL

## 2025-01-24 PROCEDURE — 97110 THERAPEUTIC EXERCISES: CPT

## 2025-01-24 NOTE — FLOWSHEET NOTE
Physical Therapy Daily Treatment Note    Date:  2025    Patient Name:  Manuel Woody    :  1965  MRN: [id number]  Restrictions/Precautions:   R RTC tear   Medical/Treatment Diagnosis Information: Elbow Chart    Strain of muscle, fascia and tendon of long head of biceps, right arm, initial encounter [S46.111A]       Insurance/Certification information:   [facility]   Physician Information:   Daniel Farris MD  Plan of care signed (Y/N):  n  Visit# / total visits:  3/10 of 2nd POC  13 visits total  Pain level: /10       Time In: 1045  Time Out:  1120    Progress Note: []  Yes  []  No  Next due by: Visit #10  2025    Subjective: Patient noting minimal elbow pain.  States has been unable to push elbow due to shoulder pain.  Sees Dr for the shoulder and elbow Mon.  Patient frustrated that can't do most things due to shoulder.         Objective: RAÚL complete per flow chart to facilitate strength, motion and stability. Verbal cuing for proper technique and order of exercises.  Progressed several exs this date with reps and weight.  Patient fatigues quickly, needing to rest several exs.       Observation:   Test measurements:    AROM R elbow extension: 3°  flex: 140°  Elbow flexion 4+/5  Elbow Ext 4+/5  Supination 4/5    Exercises:   Exercise/Equipment Resistance/Repetitions Other comments        Elbow Ext     Elbow AROM flexion 20x 5#  3-way   Elbow ext stretch      Wrist flex/ext  15x5# elbow rested  Flexion only, pain with extension.    Wrist supination/pronation  20x with 2# wieght    Hammer   Ball Squeeze     Wrist Bar 20x ea Blue (Sup/Pro/Flex/)   Gripper  Rubber bands   Triceps Kick out 15x 5# Watch shoulder pain    Wrist roller (1# on string) 5x each Flex, ext, 1# weight    Wrist Maze          PROM Flex/Ext          [x] Provided verbal/tactile cueing for activities related to strengthening, flexibility, endurance, ROM. (65835)  [] Provided verbal/tactile cueing for activities

## 2025-01-24 NOTE — FLOWSHEET NOTE
Physical Therapy Daily Treatment Note    Date:  2025    Patient Name:  Manuel Woody    :  1965  MRN: [id number]  Restrictions/Precautions:     Medical/Treatment Diagnosis Information: Shoulder Chart    Strain of muscle, fascia and tendon of long head of biceps, right arm, initial encounter [S46.111A]  Other specified injuries of right shoulder and upper arm, initial encounter [S49.81XA]    Insurance/Certification information:   [facility]   Physician Information:    Taz Loya MD   Plan of care signed (Y/N):  n  Visit# / total visits: 4/10  Pain level: 0/10       Time In:   1120  Time Out:1200    Progress Note: []  Yes  [x]  No  Next due by: Visit #10  Or by    Subjective:  Patient noting continues to have difficulty with shoulder.  Patient noting has pain with any overhead reach.  States has difficulty with hanging his coat.  Patient also notes a sensation of needing to shift the shoulder around to avoid the pain.  Patient noting feels he has had no improvement to date for his shoulder strength.      Objective: There ex performed per flowsheet to increase R shoulder mobility and strength for improved ability to complete ADLs and to reduce pain. Verbal cueing for sequencing and proper form with ex. Mod fatigue/pain in shoulder with prolonged activity.  Patient noting increased pain with eccentric control for shoulder flexion.  Added several exs to facilitate shoulder strengthening this date.      Observation: Anterior shoulder pain noted with prolonged use and stretching    Test measurements:  Shoulder AROM flexion 130     4/5          Abd     104     4- to 4/5             IR                   4/5                      ER      4+/5  Exercises: Follow Elbow Restrictions  Exercise/Equipment Resistance/Repetitions Other comments   Pulley's  Added UBE with Elbow to warm up  Flex, scap        Shoulder ext stretch   wand   Shoulder IR stretch  strap   shoulder Band  15x grn   Flex/ext/row/IR/ER

## 2025-01-27 ENCOUNTER — APPOINTMENT (OUTPATIENT)
Dept: PHYSICAL THERAPY | Age: 60
End: 2025-01-27
Payer: COMMERCIAL

## 2025-01-29 ENCOUNTER — HOSPITAL ENCOUNTER (OUTPATIENT)
Dept: PHYSICAL THERAPY | Age: 60
Setting detail: THERAPIES SERIES
Discharge: HOME | End: 2025-01-29
Payer: COMMERCIAL

## 2025-01-29 PROCEDURE — 97110 THERAPEUTIC EXERCISES: CPT | Performed by: PHYSICAL THERAPY ASSISTANT

## 2025-01-29 NOTE — FLOWSHEET NOTE
Physical Therapy Daily Treatment Note    Date:  2025    Patient Name:  Manuel Woody    :  1965  MRN: [id number]  Restrictions/Precautions:   R RTC tear   Medical/Treatment Diagnosis Information: Elbow Chart    Strain of muscle, fascia and tendon of long head of biceps, right arm, initial encounter [S46.111A]       Insurance/Certification information:   [facility]   Physician Information:   Daniel Farris MD  Plan of care signed (Y/N):  n  Visit# / total visits:  4/10 of  POC  14 visits total  Pain level: 0/10       Time In: 1102  Time Out:  1142    Progress Note: []  Yes  []  No  Next due by: Visit #10  2025    Subjective: No pain in elbow this date. Patient states seeing physicians for both elbow and shoulder previous day. Physician pleased with progress in elbow, wishes to continue. Patient anticipating shoulder surgery, awaiting date.          Objective: RAÚL complete per flow chart to facilitate strength, motion and stability. Verbal cuing for proper technique and order of exercises.  Progressed several exs this date with reps and weight.  Patient fatigues quickly, needing to rest several exs. Encouraged patient to find out how many therapy visits his insurance covers annually, understanding noted. Patient given written and verbal instruction for t-band exercises. Understanding noted.       Observation:   Test measurements:    AROM R elbow extension: 3°  flex: 140°  Elbow flexion 4+/5  Elbow Ext 4+/5  Supination 4/5    Exercises:   Exercise/Equipment Resistance/Repetitions Other comments        Elbow Ext     Elbow AROM flexion 20x 5#  3-way   Elbow ext stretch      Wrist flex/ext  15x5# elbow rested  Flexion only, pain with extension.    Wrist supination/pronation  20x with 2# wieght    Hammer        Wrist Bar 20x ea Blue (Sup/Pro/Flex/)     Rubber bands   Triceps Kick out 20x 5# Watch shoulder pain    Wrist roller (1# on string) 5x each Flex, ext, 1# weight    Counter push

## 2025-01-31 ENCOUNTER — HOSPITAL ENCOUNTER (OUTPATIENT)
Dept: PHYSICAL THERAPY | Age: 60
Setting detail: THERAPIES SERIES
Discharge: HOME | End: 2025-01-31
Payer: COMMERCIAL

## 2025-01-31 PROCEDURE — 97110 THERAPEUTIC EXERCISES: CPT | Performed by: PHYSICAL THERAPY ASSISTANT

## 2025-01-31 PROCEDURE — 97110 THERAPEUTIC EXERCISES: CPT

## 2025-01-31 NOTE — FLOWSHEET NOTE
Physical Therapy Daily Treatment Note    Date:  2025    Patient Name:  Manuel Woody    :  1965  MRN: [id number]  Restrictions/Precautions:   R RTC tear   Medical/Treatment Diagnosis Information: Elbow Chart    Strain of muscle, fascia and tendon of long head of biceps, right arm, initial encounter [S46.111A]       Insurance/Certification information:   [facility]   Physician Information:   Daniel Farris MD  Plan of care signed (Y/N):  n  Visit# / total visits:  5/10 of 2nd POC  15 visits total  Pain level: 0/10       Time In: 1030  Time Out:  1100  (Per Patient gets 60 visits per year)     Progress Note: []  Yes  []  No  Next due by: Visit #10  2025    Subjective: No pain in elbow this date. Patient states seeing physicians for both elbow and shoulder on Monday.  Has not heard anything . Physician pleased with progress in elbow, wishes to continue. Patient anticipating shoulder surgery, awaiting date.          Objective: RAÚL complete per flow chart to facilitate strength, motion and stability. Verbal cuing for proper technique and order of exercises.  Progressed several exs this date with reps and weight.  Patient fatigues quickly, needing to rest several exs. Encouraged patient to find out how many therapy visits his insurance covers annually, understanding noted. Patient given written and verbal instruction for t-band exercises. Understanding noted.       Observation:   Test measurements:    AROM R elbow extension: 3°  flex: 140°  Elbow flexion 4+/5  Elbow Ext 4+/5  Supination 4/5    Exercises:   Exercise/Equipment Resistance/Repetitions Other comments        Elbow Ext     Elbow AROM flexion 20x 5#  3-way   Elbow ext stretch      Wrist flex/ext  15x5# elbow rested  Flexion only, pain with extension.    Wrist supination/pronation  20x with 3# wieght    Hammer        Wrist Bar 20x ea Blue (Sup/Pro/Flex/)     Rubber bands   Triceps Kick out 20x 5# Watch shoulder pain    Wrist

## 2025-01-31 NOTE — FLOWSHEET NOTE
Physical Therapy Daily Treatment Note    Date:  2025    Patient Name:  Manuel Woody    :  1965  MRN: [id number]  Restrictions/Precautions:     Medical/Treatment Diagnosis Information:    Strain of muscle, fascia and tendon of long head of biceps, right arm, initial encounter [S46.111A]  Other specified injuries of right shoulder and upper arm, initial encounter [S49.81XA]    Insurance/Certification information:   [facility]   Physician Information:    Taz Loya MD   Plan of care signed (Y/N):  n  Visit# / total visits: 5/10  Pain level: 0/10       Time In:   1105  Time Out:1147    Progress Note: []  Yes  [x]  No  Next due by: Visit #10  Or by    Subjective:   Increased ease with donning shirts. Tightness and discomfort noted. Increased pain when attempting to use drill overhead over weekend.     Objective: There ex performed per flowsheet to increase R shoulder mobility and strength for improved ability to complete ADLs and to reduce pain. Verbal cueing for sequencing and proper form with ex. Min fatigue and discomfort noted in shoulder with prolonged activity remain. Fatigue increased with prolonged use. Initiated and advanced several exercises to improve motion and strength.     Observation: Anterior shoulder pain noted with prolonged use and stretching    Test measurements:      Exercises: Follow Elbow Restrictions  Exercise/Equipment Resistance/Repetitions Other comments   Pulley's  2' each Flex, scap        Shoulder ext stretch  10x5'' wand   Shoulder IR stretch 10x10'' strap   4 way shoulder Band  10x RED    Rows / Ext 10 GR    Finger ladder  5x each Flex, abd   Counter push ups 10x         Wall slides     Table slides     Valpar      Shoulder ABC's  1x Standing, at 90 deg flex   Shoulder Circles 10x    Shoulder flexion/Abd      Supine pex stretch     SA punches/ABC 20x/1x 2#   SL ER/abd 15x ea 2#   Prone cuff series 10x ea 2#        PROM IR        [x] Provided verbal/tactile cueing

## 2025-02-03 ENCOUNTER — HOSPITAL ENCOUNTER (OUTPATIENT)
Dept: PHYSICAL THERAPY | Age: 60
Setting detail: THERAPIES SERIES
Discharge: HOME | End: 2025-02-03
Payer: COMMERCIAL

## 2025-02-03 ENCOUNTER — APPOINTMENT (OUTPATIENT)
Dept: PHYSICAL THERAPY | Age: 60
End: 2025-02-03
Payer: COMMERCIAL

## 2025-02-03 PROCEDURE — 97110 THERAPEUTIC EXERCISES: CPT

## 2025-02-03 NOTE — FLOWSHEET NOTE
Physical Therapy Daily Treatment Note    Date:  2/3/2025    Patient Name:  Manuel Woody    :  1965  MRN: [id number]  Restrictions/Precautions:   R RTC tear   Medical/Treatment Diagnosis Information: Elbow Chart    Strain of muscle, fascia and tendon of long head of biceps, right arm, initial encounter [S46.111A]       Insurance/Certification information:   [facility]   Physician Information:   Daniel Farris MD  Plan of care signed (Y/N):  n  Visit# / total visits:  6/10 of  POC  16 visits total  Pain level: 0/10       Time In: 816 Time Out:  848  (Per Patient gets 60 visits per year)     Progress Note: []  Yes  [x]  No  Next due by: Visit #10  2025    Subjective: Pt reports no pain in elbow, just soreness in lateral shoulder/deltoid region. Patient states he completed t-band exercises at home yesterday and over did it. Patient plans to call surgeon's office today to schedule surgery.      Objective: RAÚL complete per flow chart to facilitate strength, motion and stability. Verbal cuing for proper technique and order of exercises.  Progressed several exs this date with reps and weight.  Patient fatigues quickly, needing to rest several exs.       Observation:   Test measurements:    AROM R elbow extension: 3°  flex: 140°  Elbow flexion 4+/5  Elbow Ext 4+/5  Supination 4/5    Exercises:   Exercise/Equipment Resistance/Repetitions Other comments        Elbow Ext     Elbow AROM flexion 20x 5#  3-way   Elbow ext stretch      Wrist flex/ext  20x5# elbow rested  Flexion only, pain with extension.   ADV   Wrist supination/pronation  20x with 3# wieght    Hammer        Wrist Bar 20x ea Blue (Sup/Pro/Flex/)     Rubber bands   Triceps Kick out 20x 5# Watch shoulder pain    Wrist roller (1# on string) 5x each Flex, ext, 1# weight    Counter push ups 10x         Valpar Kidney bean  L4   T-band Rows/ext/IR/ER  15x GR ADV        PROM Flex/Ext          [x] Provided verbal/tactile cueing for

## 2025-02-05 ENCOUNTER — HOSPITAL ENCOUNTER (OUTPATIENT)
Dept: PHYSICAL THERAPY | Age: 60
Setting detail: THERAPIES SERIES
Discharge: HOME | End: 2025-02-05
Payer: COMMERCIAL

## 2025-02-05 PROCEDURE — 97110 THERAPEUTIC EXERCISES: CPT | Performed by: PHYSICAL THERAPIST

## 2025-02-05 NOTE — FLOWSHEET NOTE
Physical Therapy Daily Treatment Note    Date:  2025    Patient Name:  Manuel Woody    :  1965  MRN: [id number]  Restrictions/Precautions:   R RTC tear   Medical/Treatment Diagnosis Information: Elbow Chart    Strain of muscle, fascia and tendon of long head of biceps, right arm, initial encounter [S46.111A]       Insurance/Certification information:   [facility]   Physician Information:   Daniel Farris MD  Plan of care signed (Y/N):  n  Visit# / total visits:  7/10 of  POC  17 visits total  Pain level: 0/10       Time In: 833  Time Out:  9:06  (Per Patient gets 60 visits per year)     Progress Note: []  Yes  [x]  No  Next due by: Visit #10  2025    Subjective: \"My elbow feels pretty good this morning. I feel it a little here and there with certain exercises, but hat goes away once I finish.\"     Objective: RAÚL complete per flow chart to facilitate strength, motion and stability. Verbal cuing for proper technique and order of exercises.  Progressed several exs this date with reps and weight.  Patient fatigues quickly, needing to rest several exs.       Observation:   Test measurements:    AROM R elbow extension: 3°  flex: 140°  stiffness at end range still present  Elbow flexion 4+/5  Elbow Ext 4+/5  Supination 4/5    Exercises:   Exercise/Equipment Resistance/Repetitions Other comments        Elbow Ext     Elbow AROM flexion 20x 5#  3-way   Elbow ext stretch      Wrist flex/ext  20x5# elbow rested  Flexion only, pain with extension.   ADV   Wrist supination/pronation  20x with 3# wieght    Hammer        Wrist Bar 20x ea Blue (Sup/Pro/Flex/)     Rubber bands   Triceps Kick out 20x 5# Watch shoulder pain    Wrist roller (1# on string) 5x each Flex, ext, 1# weight    Counter push ups 15x Adv        Valpar Kidney bean  L4   T-band Rows/ext/IR/ER  20x GR ADV        PROM Flex/Ext          [x] Provided verbal/tactile cueing for activities related to strengthening, flexibility,

## 2025-02-07 ENCOUNTER — HOSPITAL ENCOUNTER (OUTPATIENT)
Dept: PHYSICAL THERAPY | Age: 60
Setting detail: THERAPIES SERIES
Discharge: HOME | End: 2025-02-07
Payer: COMMERCIAL

## 2025-02-07 ENCOUNTER — APPOINTMENT (OUTPATIENT)
Dept: PHYSICAL THERAPY | Age: 60
End: 2025-02-07
Payer: COMMERCIAL

## 2025-02-07 PROCEDURE — 97110 THERAPEUTIC EXERCISES: CPT

## 2025-02-07 NOTE — FLOWSHEET NOTE
Physical Therapy Daily Treatment Note    Date:  2025    Patient Name:  Manuel Woody    :  1965  MRN: [id number]  Restrictions/Precautions:   R RTC tear   Medical/Treatment Diagnosis Information: Elbow Chart    Strain of muscle, fascia and tendon of long head of biceps, right arm, initial encounter [S46.111A]       Insurance/Certification information:   [facility]   Physician Information:   Daniel Farris MD  Plan of care signed (Y/N):  n  Visit# / total visits:  8/10 of 2nd POC  18 visits total  Pain level: 0/10       Time In:      4:14  Time Out:      4:44  (Per Patient gets 60 visits per year)     Progress Note: []  Yes  [x]  No  Next due by: Visit #10  2025    Subjective: Pt reports elbow was a little bit sore this morning when he woke up. States weight was progressed last session and he isn't sure if that is what caused increased pain or if it was something he did at home. States 5# with pronated bicep curls last session caused pain.     Objective: RAÚL complete per flow chart to facilitate strength, motion and stability. Verbal cuing for proper technique and order of exercises. Patient fatigues quickly, needing to rest several exs.     R shoulder surgery is scheduled for 25.    Observation:   Test measurements:    AROM R elbow extension: 3°  flex: 140°  stiffness at end range still present  Elbow flexion 4+/5  Elbow Ext 4+/5  Supination 4/5    Exercises:   Exercise/Equipment Resistance/Repetitions Other comments        Elbow Ext     Elbow AROM flexion 20x 5#  3-way   Elbow ext stretch      Wrist flex/ext  20x5# elbow rested  Flexion only, pain with extension.    Wrist supination/pronation  20x with 3# weight      Hammer        Wrist Bar 20x ea Blue (Sup/Pro/Flex/)     Rubber bands   Triceps Kick out 20x 5# Watch shoulder pain    Wrist roller (1# on string) 5x each Flex, ext, 1# weight    Counter push ups 15x         Valpar Kidney bean  L4   T-band Rows/ext/IR/ER  20x GR

## 2025-02-18 ENCOUNTER — HOSPITAL ENCOUNTER (OUTPATIENT)
Dept: PHYSICAL THERAPY | Age: 60
Setting detail: THERAPIES SERIES
Discharge: HOME | End: 2025-02-18
Payer: COMMERCIAL

## 2025-02-18 PROCEDURE — 97161 PT EVAL LOW COMPLEX 20 MIN: CPT | Performed by: PHYSICAL THERAPIST

## 2025-02-18 PROCEDURE — 97110 THERAPEUTIC EXERCISES: CPT | Performed by: PHYSICAL THERAPIST

## 2025-02-18 ASSESSMENT — PAIN DESCRIPTION - DESCRIPTORS: DESCRIPTORS: ACHING

## 2025-02-18 ASSESSMENT — PAIN DESCRIPTION - PAIN TYPE: TYPE: SURGICAL PAIN

## 2025-02-18 ASSESSMENT — PAIN DESCRIPTION - ORIENTATION: ORIENTATION: RIGHT

## 2025-02-18 ASSESSMENT — PAIN SCALES - GENERAL: PAINLEVEL_OUTOF10: 0

## 2025-02-18 ASSESSMENT — PAIN DESCRIPTION - LOCATION: LOCATION: SHOULDER

## 2025-02-18 NOTE — PROGRESS NOTES
Physical Therapy  Initial Assessment  Date: 2025  Patient Name: Manuel Woody  MRN: [id number]  : 1965    Referring Physician: Taz Loya MD Michael Lee MD   PCP: Aurea Muhammad APRN - CNP     Medical Diagnosis: S/P arthroscopy of right shoulder [Z98.890]  Biceps tendinitis of right shoulder [M75.21] Z98.890 s/p scope R shoulder,  M75.21 biceps tendonitis R shoulder  No data recorded    Insurance: Payor: [employer] / Plan: [employer] / Product Type: *No Product type* /   Insurance ID: [id number] - ([employer])      Restrictions:  Position Activity Restriction  Other Position/Activity Restrictions: Immobilize R shoulder x 6 weeks, no active biceps x 4 weeks    Subjective:  General  Chart Reviewed: Yes  Patient Assessed for Rehabilitation Services: Yes  Additional Pertinent Hx: Pt underwent R shoulder sx 25  History obtained from:: Patient, Chart Review  Diagnosis: Z98.890 s/p scope R shoulder,  M75.21 biceps tendonitis R shoulder  Referring Provider (secondary): Taz Loya MD  PT Visit Information  Onset Date: 25  PT Insurance Information: [employer]  Referring Provider (secondary): Taz Loya MD  Pain Screening  Patient Currently in Pain: Yes  Pain Assessment: 0-10  Pain Level: 0 (at rest)  Best Pain Level: 5  Worst Pain Level: 5 (4-5/10  traveling in car)  Pain Type: Surgical pain  Pain Location: Shoulder  Pain Orientation: Right  Pain Descriptors: Aching       Vision/Hearing:       Orientation:  Orientation  Overall Orientation Status: Within Normal Limits  Pt is mill tamayo at [facility]  Pt to dept in sling with abduction pillow    Functional Status:       Objective:          AROM LLE (degrees)  LLE General AROM: L shoulder flexion 140 degrees, abduction 165 degrees, IR L2, ER T2  PROM RUE (degrees)  RUE General PROM: R shoulder flexion 62, ER neutral, scaption 50 degrees    Strength RUE  Comment: deferred  Strength LUE  Strength LUE: WNL  Comment: 5/5   Transfers  Sit to

## 2025-02-18 NOTE — FLOWSHEET NOTE
Physical Therapy Daily Treatment Note    Date:  2025    Patient Name:  Manuel Woody    :  1965  MRN: [id number]  Restrictions/Precautions:     Medical/Treatment Diagnosis Information:   Diagnosis: Z98.890 s/p scope R shoulder,  M75.21 biceps tendonitis R shoulder     Insurance/Certification information:  PT Insurance Information: [place]  [facility]  Physician Information:   Taz Loya  Plan of care signed (Y/N):  N  Visit# / total visits: 1 /  Pain level: 0/10       Time In:  904 Time Out: 948    Progress Note: [x]  Yes  []  No  Next due by: Visit #10  Or by 25    Subjective:   see eval    Objective: see eval  Observation:   Test measurements:      Exercises:   Exercise/Equipment Resistance/Repetitions Other comments   AROM R wrist 10x         PROM R shoulder/elbow yes                                                           [x] Provided verbal/tactile cueing for activities related to strengthening, flexibility, endurance, ROM. (37889)  [] Provided verbal/tactile cueing for activities related to improving balance, coordination, kinesthetic sense, posture, motor skill, proprioception. (62486)    Therapeutic Activities:     [] Therapeutic activities, direct (one-on-one) patient contact (use of dynamic activities to improve functional performance). (85816)    Gait:   [] Provided training and instruction to the patient for ambulation re-education. (20920)    Self-Care/ADL's  [] Self-care/home management training and compensatory training, meal preparation, safety procedures, and instructions in use of assistive technology devices/adaptive equipment, direct one-on-one contact. (60639)    Home Exercise Program:  Access Code: [email or web address]  URL: [email or web address]  Date: 2025  Prepared by: Adilene Goncalves    Exercises  - Wrist AROM Radial Ulnar Deviation  - 2 x daily - 7 x weekly - 10 reps  - Wrist AROM Flexion Extension  - 2 x daily - 7 x weekly - 10 reps   [x] Reviewed/Progressed HEP activities

## 2025-02-18 NOTE — PLAN OF CARE
Chela Cheng/[facility]  Rehabilitation and Sports Medicine    [x] [place]  Phone: [phone]  Fax: [phone]      [] [place]  Phone: [phone]  Fax: [phone]        To:   Taz Loya     Patient: Manuel Woody  : 1965   MRN: [id number]  Evaluation Date: 2025      Diagnosis Information:  Diagnosis: Z98.890 s/p scope R shoulder,  M75.21 biceps tendonitis R shoulder         Physical Therapy Certification/  Dear   The following patient has been evaluated for physical therapy services and for therapy to continue, Medicare requires monthly physician review of the treatment plan. Please review the attached evaluation and/or summary of the patient's plan of care, and verify that you agree therapy should continue by signing the attached document and sending it back to our office.    Plan of Care/Treatment to date:  [x] Therapeutic Exercise    [x] Modalities:  [x] Therapeutic Activity     [x] Ultrasound  [] Electrical Stimulation  [] Gait Training      [] Cervical Traction [] Lumbar Traction  [x] Neuromuscular Re-education    [x] Cold/hotpack [] Iontophoresis   [x] Instruction in HEP     Other:  [x] Manual Therapy      []             [] Aquatic Therapy      []                 Goals:  Short Term Goals  Time Frame for Short Term Goals: 1 week  Short Term Goal 1: Initiate HEP    Long Term Goals  Time Frame for Long Term Goals : 8 weeks  Long Term Goal 1: Independent in HEP  Long Term Goal 2: Pt will demo R shoulder functional ROM for ease with overhead and behind back following protocol and dr orders  Long Term Goal 3: Pt will demo R shoulder ./ elbow strength 4+/5 for RTPLOF following orders and protocol  Long Term Goal 4: Pt willl demo good resting posture  Long Term Goal 5: Pt will  score 65+/80 on UEFI for improved QOL    Frequency/Duration:2-18-25 - 4-1-25  # Days per week: [] 1 day # Weeks: [] 1 week [] 5 weeks     [x] 2 days   [] 2 weeks [] 6 weeks     [x] 3 days   [] 3 weeks [] 7

## 2025-02-20 ENCOUNTER — HOSPITAL ENCOUNTER (OUTPATIENT)
Dept: PHYSICAL THERAPY | Age: 60
Setting detail: THERAPIES SERIES
End: 2025-02-20
Payer: COMMERCIAL

## 2025-02-20 ENCOUNTER — HOSPITAL ENCOUNTER (OUTPATIENT)
Dept: PHYSICAL THERAPY | Age: 60
Setting detail: THERAPIES SERIES
Discharge: HOME | End: 2025-02-20
Payer: COMMERCIAL

## 2025-02-20 PROCEDURE — 97140 MANUAL THERAPY 1/> REGIONS: CPT

## 2025-02-20 NOTE — FLOWSHEET NOTE
Physical Therapy Daily Treatment Note    Date:  2025    Patient Name:  Manuel Woody    :  1965  MRN: [id number]  Restrictions/Precautions:     Medical/Treatment Diagnosis Information:   Diagnosis: Z98.890 s/p scope R shoulder,  M75.21 biceps tendonitis R shoulder     Insurance/Certification information:  PT Insurance Information: [facility]  Physician Information:   Taz Loya  Plan of care signed (Y/N):  N  Visit# / total visits: 2/10  Pain level: 0/10       Time In:200 Time Out: 230    Progress Note: []  Yes  [x]  No  Next due by: Visit #10  Or by 25    Subjective:   Patient noting no pain currently.  Patient noting increased pain when sitting in the car to go to the  Dr.      Objective: PROM to the R shoulder and elbow, tightness and muscle guarding at end ROM.      Observation:   Test measurements:  PROM ER to 40, flexion to 100.      Exercises:   Exercise/Equipment Resistance/Repetitions Other comments   AROM R wrist 10x HEP         PROM R shoulder/elbow yes         Pendulums  HEP                                                  [x] Provided verbal/tactile cueing for activities related to strengthening, flexibility, endurance, ROM. (00180)  [] Provided verbal/tactile cueing for activities related to improving balance, coordination, kinesthetic sense, posture, motor skill, proprioception. (89993)    Therapeutic Activities:     [] Therapeutic activities, direct (one-on-one) patient contact (use of dynamic activities to improve functional performance). (63898)    Gait:   [] Provided training and instruction to the patient for ambulation re-education. (18624)    Self-Care/ADL's  [] Self-care/home management training and compensatory training, meal preparation, safety procedures, and instructions in use of assistive technology devices/adaptive equipment, direct one-on-one contact. (48599)    Home Exercise Program:  Access Code: [email or web address]  URL: [email or web address]  Date:

## 2025-02-25 ENCOUNTER — HOSPITAL ENCOUNTER (OUTPATIENT)
Dept: PHYSICAL THERAPY | Age: 60
Setting detail: THERAPIES SERIES
Discharge: HOME | End: 2025-02-25
Payer: COMMERCIAL

## 2025-02-25 PROCEDURE — 97110 THERAPEUTIC EXERCISES: CPT

## 2025-02-25 PROCEDURE — 97140 MANUAL THERAPY 1/> REGIONS: CPT

## 2025-02-27 ENCOUNTER — HOSPITAL ENCOUNTER (OUTPATIENT)
Dept: PHYSICAL THERAPY | Age: 60
Setting detail: THERAPIES SERIES
Discharge: HOME | End: 2025-02-27
Payer: COMMERCIAL

## 2025-02-27 PROCEDURE — 97140 MANUAL THERAPY 1/> REGIONS: CPT

## 2025-02-27 PROCEDURE — 97110 THERAPEUTIC EXERCISES: CPT

## 2025-02-27 NOTE — FLOWSHEET NOTE
Physical Therapy Daily Treatment Note    Date:  2025    Patient Name:  Manuel Woody    :  1965  MRN: [id number]  Restrictions/Precautions:     Medical/Treatment Diagnosis Information:   Diagnosis: Z98.890 s/p scope R shoulder,  M75.21 biceps tendonitis R shoulder   Surgery 25  Insurance/Certification information:  PT Insurance Information: ANDRE[employer] LE  Physician Information:   Taz Loya  Plan of care signed (Y/N):  N  Visit# / total visits: 4/10  Pain level: 0/10       Time In: 10:05 Time Out: 10:40    Progress Note: []  Yes  [x]  No  Next due by: Visit #10  Or by 25    Subjective:   Patient noting no pain currently.      Objective: PROM to the R shoulder and elbow, tightness and muscle guarding at end ROM.    Verbal cueing for sequencing and proper form.     Observation:     Test measurements:     PROM:  Flexion 125  Abd 98  IR 72  ER 30    Exercises:   Exercise/Equipment Resistance/Repetitions Other comments   AROM R wrist 10x HEP         PROM R shoulder/elbow 15'         Pendulums  HEP  10x    Yellow Putty  Gripping x 15 reps  HEP                                            [x] Provided verbal/tactile cueing for activities related to strengthening, flexibility, endurance, ROM. (18959)  [] Provided verbal/tactile cueing for activities related to improving balance, coordination, kinesthetic sense, posture, motor skill, proprioception. (74119)    Therapeutic Activities:     [] Therapeutic activities, direct (one-on-one) patient contact (use of dynamic activities to improve functional performance). (72943)    Gait:   [] Provided training and instruction to the patient for ambulation re-education. (23305)    Self-Care/ADL's  [] Self-care/home management training and compensatory training, meal preparation, safety procedures, and instructions in use of assistive technology devices/adaptive equipment, direct one-on-one contact. (86248)    Home Exercise Program:  Access Code: [email or web address]  URL:

## 2025-03-04 ENCOUNTER — HOSPITAL ENCOUNTER (OUTPATIENT)
Dept: PHYSICAL THERAPY | Age: 60
Setting detail: THERAPIES SERIES
Discharge: HOME | End: 2025-03-04
Payer: COMMERCIAL

## 2025-03-04 PROCEDURE — 97110 THERAPEUTIC EXERCISES: CPT | Performed by: PHYSICAL THERAPIST

## 2025-03-04 PROCEDURE — 97140 MANUAL THERAPY 1/> REGIONS: CPT | Performed by: PHYSICAL THERAPIST

## 2025-03-04 NOTE — FLOWSHEET NOTE
Exercise Program:   [] Reviewed/Progressed HEP activities related to strengthening, flexibility, endurance, ROM. (43596)  [] Reviewed/Progressed HEP activities related to improving balance, coordination, kinesthetic sense, posture, motor skill, proprioception.  (52366)    Manual Treatments:    [] Provided manual therapy to mobilize soft tissue/joints for the purpose of modulating pain, promoting relaxation,  increasing ROM, reducing/eliminating soft tissue swelling/inflammation/restriction, improving soft tissue extensibility. (09861)    Service Based Modalities:      Timed Code Treatment Minutes:  11' therex, 20' man     Total Treatment Minutes:   31'    Treatment/Activity Tolerance:  [x] Patient tolerated treatment well [] Patient limited by fatique  [] Patient limited by pain  [] Patient limited by other medical complications  [] Other:     Prognosis: [x] Good [] Fair  [] Poor    Patient Requires Follow-up: [x] Yes  [] No    Goals:  Short Term Goals  Time Frame for Short Term Goals: 1 week  Short Term Goal 1: Initiate HEP    Long Term Goals  Time Frame for Long Term Goals : 8 weeks  Long Term Goal 1: Independent in HEP  Long Term Goal 2: Pt will demo R shoulder functional ROM for ease with overhead and behind back following protocol and dr orders  Long Term Goal 3: Pt will demo R shoulder ./ elbow strength 4+/5 for RTPLOF following orders and protocol  Long Term Goal 4: Pt willl demo good resting posture  Long Term Goal 5: Pt will  score 65+/80 on UEFI for improved QOL    Plan:   [x] Continue per plan of care [] Alter current plan (see comments)  [] Plan of care initiated [] Hold pending MD visit [] Discharge    Plan for Next Session:  progress with protocol/orders    Electronically signed by:  Adilene Goncalves, PT,

## 2025-03-06 ENCOUNTER — HOSPITAL ENCOUNTER (OUTPATIENT)
Dept: PHYSICAL THERAPY | Age: 60
Setting detail: THERAPIES SERIES
Discharge: HOME | End: 2025-03-06
Payer: COMMERCIAL

## 2025-03-06 PROCEDURE — 97110 THERAPEUTIC EXERCISES: CPT

## 2025-03-06 PROCEDURE — 97140 MANUAL THERAPY 1/> REGIONS: CPT

## 2025-03-06 NOTE — FLOWSHEET NOTE
Physical Therapy Daily Treatment Note    Date:  3/6/2025    Patient Name:  Mnauel Woody    :  1965  MRN: [id number]  Restrictions/Precautions:     Medical/Treatment Diagnosis Information:   Diagnosis: Z98.890 s/p scope R shoulder,  M75.21 biceps tendonitis R shoulder   Surgery 25  Insurance/Certification information:  PT Insurance Information: ANDREINA LE  Physician Information:   Taz Loya  Plan of care signed (Y/N):  N  Visit# / total visits: 6/10  Pain level: 0/10       Time In: 10:54 Time Out: 11:22    Progress Note: []  Yes  [x]  No  Next due by: Visit #10  Or by 25    Subjective:   Pt voices no complaints or changes. RTD in 1 week.     Objective: PROM to the R shoulder and elbow, tightness and muscle guarding at end ROM.    Verbal cueing for sequencing and proper form.     Observation:     Test measurements:     PROM:  Flexion 136  Abd 105  IR 72  ER 38    Exercises:   Exercise/Equipment Resistance/Repetitions Other comments   AROM R wrist 15x HEP         PROM R shoulder/elbow 20'         Pendulums  HEP  10x    Yellow Putty/ball squeeze Gripping x 20 reps  HEP                                            [x] Provided verbal/tactile cueing for activities related to strengthening, flexibility, endurance, ROM. (92065)  [] Provided verbal/tactile cueing for activities related to improving balance, coordination, kinesthetic sense, posture, motor skill, proprioception. (28004)    Therapeutic Activities:     [] Therapeutic activities, direct (one-on-one) patient contact (use of dynamic activities to improve functional performance). (99933)    Gait:   [] Provided training and instruction to the patient for ambulation re-education. (34102)    Self-Care/ADL's  [] Self-care/home management training and compensatory training, meal preparation, safety procedures, and instructions in use of assistive technology devices/adaptive equipment, direct one-on-one contact. (38912)    Home Exercise Program:   []

## 2025-03-11 ENCOUNTER — HOSPITAL ENCOUNTER (OUTPATIENT)
Dept: PHYSICAL THERAPY | Age: 60
Setting detail: THERAPIES SERIES
Discharge: HOME | End: 2025-03-11
Payer: COMMERCIAL

## 2025-03-11 PROCEDURE — 97110 THERAPEUTIC EXERCISES: CPT

## 2025-03-11 PROCEDURE — 97140 MANUAL THERAPY 1/> REGIONS: CPT

## 2025-03-11 NOTE — FLOWSHEET NOTE
Physical Therapy Daily Treatment Note    Date:  3/11/2025    Patient Name:  Manuel Woody    :  1965  MRN: [id number]  Restrictions/Precautions:     Medical/Treatment Diagnosis Information:   Diagnosis: Z98.890 s/p scope R shoulder,  M75.21 biceps tendonitis R shoulder   Surgery 25  Insurance/Certification information:  PT Insurance Information: ANDREINA LE  Physician Information:   Taz Loya  Plan of care signed (Y/N):  N  Visit# / total visits: 7/10  Pain level: 0/10       Time In: 12:32 Time Out: 1:01    Progress Note: []  Yes  [x]  No  Next due by: Visit #10  Or by 25    Subjective:   Pt reports shoulder was achy from last session. States his biceps has been \"flinching\" for a few seconds or 1-2 minutes when resting arm. RTD on Thursday.     Objective: PROM to the R shoulder and elbow, tightness and muscle guarding at end ROM.    Verbal cueing for sequencing and proper form.     Observation:     Test measurements:     PROM:  Flexion 136  Abd 105  IR 72  ER 38    Exercises:   Exercise/Equipment Resistance/Repetitions Other comments   AROM R wrist 15x HEP         PROM R shoulder/elbow 20'         Pendulums   HEP  15x    Yellow Putty/ball squeeze Gripping x 20 reps  HEP                                            [x] Provided verbal/tactile cueing for activities related to strengthening, flexibility, endurance, ROM. (49211)  [] Provided verbal/tactile cueing for activities related to improving balance, coordination, kinesthetic sense, posture, motor skill, proprioception. (04671)    Therapeutic Activities:     [] Therapeutic activities, direct (one-on-one) patient contact (use of dynamic activities to improve functional performance). (55611)    Gait:   [] Provided training and instruction to the patient for ambulation re-education. (50416)    Self-Care/ADL's  [] Self-care/home management training and compensatory training, meal preparation, safety procedures, and instructions in use of assistive

## 2025-03-14 ENCOUNTER — HOSPITAL ENCOUNTER (OUTPATIENT)
Dept: PHYSICAL THERAPY | Age: 60
Setting detail: THERAPIES SERIES
Discharge: HOME | End: 2025-03-14
Payer: COMMERCIAL

## 2025-03-14 PROCEDURE — 97140 MANUAL THERAPY 1/> REGIONS: CPT | Performed by: PHYSICAL THERAPIST

## 2025-03-14 PROCEDURE — 97110 THERAPEUTIC EXERCISES: CPT | Performed by: PHYSICAL THERAPIST

## 2025-03-14 NOTE — FLOWSHEET NOTE
Physical Therapy Daily Treatment Note    Date:  3/14/2025    Patient Name:  Manuel Woody    :  1965  MRN: [id number]  Restrictions/Precautions:     Medical/Treatment Diagnosis Information:   Diagnosis: Z98.890 s/p scope R shoulder,  M75.21 biceps tendonitis R shoulder   Surgery 25  Insurance/Certification information:  PT Insurance Information: ANDREINA LE  Physician Information:   Taz Loya  Plan of care signed (Y/N):  N  Visit# / total visits: 8/10  Pain level: 0/10       Time In: 8:48  Time Out: 9:17    Progress Note: []  Yes  [x]  No  Next due by: Visit #10  Or by 25    Subjective:   Per physician orders 3/13/25: Can discontinue abduction pillow, but continue sling use for 2 more weeks. Can add AAROM exercises and should continue PROM. Per patient: \"My shoulder feels pretty good this morning, but I woke up with some stiffness and sicomfort in the right wrist.\"      Objective: PROM to the R shoulder and elbow, tightness and muscle guarding at end ROM.    Verbal cueing for sequencing and proper form and to relax shoulder/decrease compensations and guarding.    Observation:     Test measurements:     PROM:  Flexion 138  Abd 120  IR 73  ER 45.    Exercises:   Exercise/Equipment Resistance/Repetitions Other comments   Pulleys 3 min Increase by 1 min over next 2 weeks   AROM R wrist 15x HEP         PROM R shoulder/elbow 20'         Pendulums   HEP  15x    stress ball squeeze Gripping x 20 reps  HEP                                            [x] Provided verbal/tactile cueing for activities related to strengthening, flexibility, endurance, ROM. (61020)  [] Provided verbal/tactile cueing for activities related to improving balance, coordination, kinesthetic sense, posture, motor skill, proprioception. (38136)    Therapeutic Activities:     [] Therapeutic activities, direct (one-on-one) patient contact (use of dynamic activities to improve functional performance). (39469)    Gait:   [] Provided training

## 2025-03-18 ENCOUNTER — HOSPITAL ENCOUNTER (OUTPATIENT)
Dept: PHYSICAL THERAPY | Age: 60
Setting detail: THERAPIES SERIES
Discharge: HOME | End: 2025-03-18
Payer: COMMERCIAL

## 2025-03-18 PROCEDURE — 97110 THERAPEUTIC EXERCISES: CPT

## 2025-03-18 PROCEDURE — 97140 MANUAL THERAPY 1/> REGIONS: CPT

## 2025-03-18 NOTE — FLOWSHEET NOTE
visit [] Discharge    Plan for Next Session:  progress with protocol/orders    Electronically signed by:  Rosita Yeager PTA

## 2025-03-20 ENCOUNTER — HOSPITAL ENCOUNTER (OUTPATIENT)
Dept: PHYSICAL THERAPY | Age: 60
Setting detail: THERAPIES SERIES
Discharge: HOME | End: 2025-03-20
Payer: COMMERCIAL

## 2025-03-20 PROCEDURE — 97140 MANUAL THERAPY 1/> REGIONS: CPT

## 2025-03-20 PROCEDURE — 97110 THERAPEUTIC EXERCISES: CPT

## 2025-03-20 NOTE — FLOWSHEET NOTE
see above    Plan:   [x] Continue per plan of care [] Alter current plan (see comments)  [] Plan of care initiated [] Hold pending MD visit [] Discharge    Plan for Next Session:  progress with protocol/orders    Electronically signed by:  Rosita Yeager PTA

## 2025-03-25 ENCOUNTER — HOSPITAL ENCOUNTER (OUTPATIENT)
Dept: PHYSICAL THERAPY | Age: 60
Setting detail: THERAPIES SERIES
Discharge: HOME | End: 2025-03-25
Payer: COMMERCIAL

## 2025-03-25 PROCEDURE — 97140 MANUAL THERAPY 1/> REGIONS: CPT

## 2025-03-25 PROCEDURE — 97110 THERAPEUTIC EXERCISES: CPT

## 2025-03-25 NOTE — FLOWSHEET NOTE
Physical Therapy Daily Treatment Note    Date:  3/25/2025    Patient Name:  Manuel Woody    :  1965  MRN: [id number]  Restrictions/Precautions:     Medical/Treatment Diagnosis Information:   Diagnosis: Z98.890 s/p scope R shoulder,  M75.21 biceps tendonitis R shoulder   Surgery 25  Insurance/Certification information:  PT [place] Information[place]  BCDE[employer]  Physician Information:   Taz Loya  Plan of care signed (Y/N):  N  Visit# / total visits: 1/10 2nd POC  Total: 11  Pain level: 0/10       Time In: 9:30  Time Out: 10:03    Progress Note: []  Yes  [x]  No  Next due by: Visit #10  Or by 25    Subjective:  Had more soreness yesterday. Did not do anything different.     RTD: 4/10/25    Objective: PROM to the R shoulder and elbow, tightness and muscle guarding at end ROM.    Verbal cueing for sequencing and proper form and to relax shoulder/decrease compensations and guarding. Pt tolerated new AAROM well.     Observation:     Test measurements:         5 weeks post op 3/21/25    Exercises:  Per physician orders 3/13/25: Can discontinue abduction pillow, but continue sling use for 2 more weeks. Can add AAROM exercises and should continue PROM.  Exercise/Equipment Resistance/Repetitions Other comments   Pulleys 5 min Increase by 1 min over next 2 weeks   AROM R wrist 15x HEP         PROM R shoulder/elbow 20'         Pendulums   HEP      stress ball squeeze Gripping x 20 reps  HEP         Table slides 10x F, Scap        Supine wand  10x Bench, flex, ER        Finger ladder 5x              [x] Provided verbal/tactile cueing for activities related to strengthening, flexibility, endurance, ROM. (45711)  [] Provided verbal/tactile cueing for activities related to improving balance, coordination, kinesthetic sense, posture, motor skill, proprioception. (34476)    Therapeutic Activities:     [] Therapeutic activities, direct (one-on-one) patient contact (use of dynamic activities to improve functional

## 2025-03-27 ENCOUNTER — HOSPITAL ENCOUNTER (OUTPATIENT)
Dept: PHYSICAL THERAPY | Age: 60
Setting detail: THERAPIES SERIES
Discharge: HOME | End: 2025-03-27
Payer: COMMERCIAL

## 2025-03-27 PROCEDURE — 97110 THERAPEUTIC EXERCISES: CPT

## 2025-03-27 NOTE — FLOWSHEET NOTE
Physical Therapy Daily Treatment Note    Date:  3/27/2025    Patient Name:  Manuel Woody    :  1965  MRN: [id number]  Restrictions/Precautions:     Medical/Treatment Diagnosis Information:   Diagnosis: Z98.890 s/p scope R shoulder,  M75.21 biceps tendonitis R shoulder   Surgery 25  Insurance/Certification information:  PT [place]  [employer]  Physician Information:   Taz Loya  Plan of care signed (Y/N):  N  Visit# / total visits: 2/10 2nd POC  Total: 12  Pain level: 0/10       Time In: 1002  Time Out: 10:33  Progress Note: []  Yes  [x]  No  Next due by: Visit #10  Or by 25    Subjective:  Patient reporting pain of 6-7/10 this date with AAROM.      RTD: 4/10/25    Objective: PROM to the R shoulder and elbow, tightness and muscle guarding at end ROM.    Verbal cueing for sequencing and proper form and to relax shoulder/decrease compensations and guarding. Pt tolerated new AAROM well.     Observation:     Test measurements: PROM flex 150, Abd 170, ER 55, IR 79        5 weeks post op 3/21/25    Exercises:  Per physician orders 3/13/25: Can discontinue abduction pillow, but continue sling use for 2 more weeks. Can add AAROM exercises and should continue PROM.  Exercise/Equipment Resistance/Repetitions Other comments   Pulleys 6 min Flex/abd    AROM R wrist 15x HEP         PROM R shoulder/elbow 15'         Pendulums   HEP      stress ball squeeze  HEP         Table slides 10x F, Scap        Supine wand  10x Bench, flex, ER        Finger ladder 5x              [x] Provided verbal/tactile cueing for activities related to strengthening, flexibility, endurance, ROM. (19031)  [] Provided verbal/tactile cueing for activities related to improving balance, coordination, kinesthetic sense, posture, motor skill, proprioception. (86907)    Therapeutic Activities:     [] Therapeutic activities, direct (one-on-one) patient contact (use of dynamic activities to improve functional performance).

## 2025-04-01 ENCOUNTER — HOSPITAL ENCOUNTER (OUTPATIENT)
Dept: PHYSICAL THERAPY | Age: 60
Setting detail: THERAPIES SERIES
Discharge: HOME | End: 2025-04-01
Payer: COMMERCIAL

## 2025-04-01 PROCEDURE — 97110 THERAPEUTIC EXERCISES: CPT

## 2025-04-01 NOTE — FLOWSHEET NOTE
Physical Therapy Daily Treatment Note    Date:  2025    Patient Name:  Manuel Woody    :  1965  MRN: [id number]  Restrictions/Precautions:     Medical/Treatment Diagnosis Information:   Diagnosis: Z98.890 s/p scope R shoulder,  M75.21 biceps tendonitis R shoulder   Surgery 25  Insurance/Certification information:  PT Insurance Information: [employer]RE[employer] LE  Physician Information:   Taz Loya  Plan of care signed (Y/N):  N  Visit# / total visits: 3/10 2nd POC  Total: 13  Pain level: 6/10       Time In: 1015  Time Out: 10:44    Progress Note: []  Yes  [x]  No  Next due by: Visit #10  Or by 25    Subjective:  Pt reports soreness mostly with movement. States eccentric AROM motions are the most painful    RTD: 4/10/25    Objective: PROM to the R shoulder and elbow, tightness and muscle guarding at end ROM.    Verbal cueing for sequencing and proper form and to relax shoulder/decrease compensations and guarding. Pt tolerated new AAROM well.     Observation:     Test measurements:         6 weeks post op 3/28/25    Exercises:  Per physician orders 3/13/25: Can discontinue abduction pillow, but continue sling use for 2 more weeks. Can add AAROM exercises and should continue PROM.  Exercise/Equipment Resistance/Repetitions Other comments   Pulleys 6 min Flex/abd    AROM R wrist 15x HEP         PROM R shoulder/elbow 15'         Pendulums   HEP      stress ball squeeze  HEP         Table slides 10x F, Scap        Supine wand  10x Bench, flex, ER        Finger ladder 5x              [x] Provided verbal/tactile cueing for activities related to strengthening, flexibility, endurance, ROM. (20841)  [] Provided verbal/tactile cueing for activities related to improving balance, coordination, kinesthetic sense, posture, motor skill, proprioception. (98816)    Therapeutic Activities:     [] Therapeutic activities, direct (one-on-one) patient contact (use of dynamic activities to improve functional performance).

## 2025-04-03 ENCOUNTER — HOSPITAL ENCOUNTER (OUTPATIENT)
Dept: PHYSICAL THERAPY | Age: 60
Setting detail: THERAPIES SERIES
Discharge: HOME | End: 2025-04-03
Payer: COMMERCIAL

## 2025-04-03 PROCEDURE — 97110 THERAPEUTIC EXERCISES: CPT | Performed by: PHYSICAL THERAPY ASSISTANT

## 2025-04-03 NOTE — FLOWSHEET NOTE
Physical Therapy Daily Treatment Note    Date:  4/3/2025    Patient Name:  Manuel Woody    :  1965  MRN: [id number]  Restrictions/Precautions:     Medical/Treatment Diagnosis Information:   Diagnosis: Z98.890 s/p scope R shoulder,  M75.21 biceps tendonitis R shoulder   Surgery 25  Insurance/Certification information:  PT Insurance Information: ANDREINA LE  Physician Information:   Taz Loya  Plan of care signed (Y/N):  N  Visit# / total visits: 4/10 2nd POC  Total: 14  Pain level: 0-6/10       Time In: 0958  Time Out: 1025    Progress Note: []  Yes  [x]  No  Next due by: Visit #10  Or by 25    Subjective:  Pt reports soreness and discomfort remain. Pain varies with activity. 6/10 with use.     RTD: 4/10/25    Objective: RAÚL complete per [facility] [place] chart to facilitate strength, motion and decrease tightness and pain. Verbal cueing for sequencing and proper form and to relax shoulder/decrease compensations and guarding. Catching noted with supine AAROM. PROM performed with tightness noted at end range.     Observation:     Test measurements:         6 weeks post op 3/28/25    Exercises:  Per physician orders 3/13/25: Can discontinue abduction pillow, but continue sling use for 2 more weeks. Can add AAROM exercises and should continue PROM.  Exercise/Equipment Resistance/Repetitions Other comments   Pulleys 6 min Flex/abd    AROM R wrist 15x HEP         PROM R shoulder/elbow 15'         Pendulums   HEP      stress ball squeeze  HEP         Table slides 10x F, Scap        Supine wand  10x Bench, flex, ER        Finger ladder 5x              [x] Provided verbal/tactile cueing for activities related to strengthening, flexibility, endurance, ROM. (20651)  [] Provided verbal/tactile cueing for activities related to improving balance, coordination, kinesthetic sense, posture, motor skill, proprioception. (83385)    Therapeutic Activities:     [] Therapeutic activities, direct (one-on-one) patient contact (use of

## 2025-04-08 ENCOUNTER — HOSPITAL ENCOUNTER (OUTPATIENT)
Dept: PHYSICAL THERAPY | Age: 60
Setting detail: THERAPIES SERIES
Discharge: HOME | End: 2025-04-08
Payer: COMMERCIAL

## 2025-04-08 PROCEDURE — 97110 THERAPEUTIC EXERCISES: CPT | Performed by: PHYSICAL THERAPIST

## 2025-04-08 NOTE — FLOWSHEET NOTE
Physical Therapy Daily Treatment Note    Date:  2025    Patient Name:  Manuel Woody    :  1965  MRN: [id number]  Restrictions/Precautions:     Medical/Treatment Diagnosis Information:   Diagnosis: Z98.890 s/p scope R shoulder,  M75.21 biceps tendonitis R shoulder   Surgery 25  Insurance/Certification information:  PT Insurance Information: ANDREINA LE  Physician Information:   Taz Loya  Plan of care signed (Y/N):  N  Visit# / total visits: 5/10 2nd POC  Total: 15  Pain level: 0-8/10       Time In: 0804 Time Out: 8'    Progress Note: []  Yes  [x]  No  Next due by: Visit #10  Or by 25    Subjective:  Pt reports soreness and occasional catching with exercises.  RTD: 4/10/25    Objective: RAÚL complete per [facility] chart to facilitate strength, motion and decrease tightness and pain. Verbal cueing for sequencing and proper form and to relax shoulder/decrease compensations and guarding.  PROM performed with tightness noted at end range.     Observation:     Test measurements: PROM R shoulder flexion  157 degrees ,  abduction  171 degrees ,  ER  58 degrees        6 weeks post op 3/28/25    Exercises:  Per physician orders 3/13/25: Can discontinue abduction pillow, but continue sling use for 2 more weeks. Can add AAROM exercises and should continue PROM.  Exercise/Equipment Resistance/Repetitions Other comments   Pulleys 6 min Flex/abd    AROM R wrist 15x  1# HEP         PROM R shoulder/elbow 10'         Pendulums   HEP      stress ball squeeze  HEP         Table slides 10x hold 5 sec F, Scap        Supine wand  10x Bench, flex, ER        Finger ladder Held today due to catching             [x] Provided verbal/tactile cueing for activities related to strengthening, flexibility, endurance, ROM. (76091)  [] Provided verbal/tactile cueing for activities related to improving balance, coordination, kinesthetic sense, posture, motor skill, proprioception. (70572)    Therapeutic Activities:     [] Therapeutic

## 2025-04-11 ENCOUNTER — HOSPITAL ENCOUNTER (OUTPATIENT)
Dept: PHYSICAL THERAPY | Age: 60
Setting detail: THERAPIES SERIES
Discharge: HOME | End: 2025-04-11
Payer: COMMERCIAL

## 2025-04-11 PROCEDURE — 97110 THERAPEUTIC EXERCISES: CPT | Performed by: PHYSICAL THERAPY ASSISTANT

## 2025-04-11 NOTE — FLOWSHEET NOTE
Physical Therapy Daily Treatment Note    Date:  2025    Patient Name:  Manuel Woody    :  1965  MRN: [id number]  Restrictions/Precautions:     Medical/Treatment Diagnosis Information:   Diagnosis: Z98.890 s/p scope R shoulder,  M75.21 biceps tendonitis R shoulder   Surgery 25  Insurance/Certification information:  PT Insurance Information: [place]  BCDEANDRE  Physician Information:   Taz Loya  Plan of care signed (Y/N):  N  Visit# / total visits: 6/10 2nd POC  Total: 16  Pain level: 0-8/10       Time In:1003 Time Out:  1044    Progress Note: []  Yes  [x]  No  Next due by: Visit #10  Or by 25    Subjective:  No pain at initiation of session. Physician pleased at this time. Allowed   RTD: 4/10/25    Objective: RAÚL complete per [facility] chart to facilitate strength, motion and decrease tightness and pain. Verbal cueing for sequencing and proper form and to relax shoulder/decrease compensations and guarding. Initiated and advanced several exercises to improve motion and strength. Discussed  PROM performed with tightness noted at end range.     Observation:     Test measurements: PROM R shoulder flexion  157 degrees ,  abduction  171 degrees ,  ER  58 degrees        6 weeks post op 3/28/25    Exercises:  Per physician orders 4/10/25 able to start strengthening with 1-2# and increasing by 1-2# weekly.     Exercise/Equipment Resistance/Repetitions Other comments   Pulleys 6 min Flex/abd         Finger ladder 5x    Standing wand 10x  Flex, abd             PROM R shoulder/elbow 10'         Pendulums   HEP      stress ball squeeze  HEP         Table slides 10x hold 5 sec F, Scap        Supine wand  10x Bench, flex, ER   ABC's 1x    Ceiling punch  10x    Supine hand to pillow 10x    Sl'ing Abd,ER 10x ea              [x] Provided verbal/tactile cueing for activities related to strengthening, flexibility, endurance, ROM. (49074)  [] Provided verbal/tactile cueing for activities related to improving balance,

## 2025-04-15 ENCOUNTER — HOSPITAL ENCOUNTER (OUTPATIENT)
Dept: PHYSICAL THERAPY | Age: 60
Setting detail: THERAPIES SERIES
Discharge: HOME | End: 2025-04-15
Payer: COMMERCIAL

## 2025-04-15 PROCEDURE — 97110 THERAPEUTIC EXERCISES: CPT | Performed by: PHYSICAL THERAPIST

## 2025-04-15 NOTE — FLOWSHEET NOTE
Physical Therapy Daily Treatment Note    Date:  4/15/2025    Patient Name:  Manuel Woody    :  1965  MRN: [id number]  Restrictions/Precautions:     Medical/Treatment Diagnosis Information:   Diagnosis: Z98.890 s/p scope R shoulder,  M75.21 biceps tendonitis R shoulder   Surgery 25  Insurance/Certification information:  PT Insurance Information: [place]  BC  Physician Information:   Taz Loya  Plan of care signed (Y/N):  N  Visit# / total visits: 7/10 2nd POC  Total: 17  Pain level: 0-810       Time In:9:55 Time Out:  1030    Progress Note: []  Yes  [x]  No  Next due by: Visit #10  Or by 25    Subjective:  No pain at initiation of session. Pt reports pain when reaching onto dash board    Objective: RAÚL complete per floc chart to facilitate strength, motion and decrease tightness and pain. Verbal cueing for sequencing and proper form and to relax shoulder/decrease compensations and guarding. Initiated and advanced several exercises to improve motion and strength. Discussed  PROM performed with tightness noted at end range.     Observation:     Test measurements:  AROM standing flexion 138 degrees, abduction  115. ER T1        6 weeks post op 3/28/25    Exercises:  Per physician orders 4/10/25 able to start strengthening with 1-2# and increasing by 1-2# weekly.     Exercise/Equipment Resistance/Repetitions Other comments   Pulleys 1 min ea Flex/abd         Wall Slides 10x Flexion/scaption   Standing wand 10x  ea Flex, abd IR , ext             PROM R shoulder/elbow 10'         Pendulums   HEP      stress ball squeeze  HEP         Table slides F, Scap        Supine flex 10x Bench, flex, ER   ABC's 1x    Ceiling punch  10x    Supine hand to pillow 10x    Sl'ing Abd,ER 10x ea              [x] Provided verbal/tactile cueing for activities related to strengthening, flexibility, endurance, ROM. (91718)  [] Provided verbal/tactile cueing for activities related to improving balance, coordination, kinesthetic

## 2025-04-17 ENCOUNTER — HOSPITAL ENCOUNTER (OUTPATIENT)
Dept: PHYSICAL THERAPY | Age: 60
Setting detail: THERAPIES SERIES
Discharge: HOME | End: 2025-04-17
Payer: COMMERCIAL

## 2025-04-17 PROCEDURE — 97110 THERAPEUTIC EXERCISES: CPT | Performed by: PHYSICAL THERAPIST

## 2025-04-17 NOTE — FLOWSHEET NOTE
Physical Therapy Daily Treatment Note    Date:  2025    Patient Name:  Manuel Woody    :  1965  MRN: [id number]  Restrictions/Precautions:     Medical/Treatment Diagnosis Information:   Diagnosis: Z98.890 s/p scope R shoulder,  M75.21 biceps tendonitis R shoulder   Surgery 25  Insurance/Certification information:  PT Insurance Information: ANDREINA LE  Physician Information:   Taz Loya  Plan of care signed (Y/N):  N  Visit# / total visits: 8/10 2nd POC  Total: 18  Pain level: 0-8/10       Time In:1255 Time Out:  1333    Progress Note: []  Yes  [x]  No  Next due by: Visit #10  Or by 25    Subjective:  Pt reports increased soreness this am  .  Objective: RAÚL complete per [facility] chart to facilitate strength, motion and decrease tightness and pain. Verbal cueing for sequencing and proper form and to relax shoulder/decrease compensations and guarding. Initiated and advanced several exercises to improve motion and strength. Discussed  PROM performed with tightness noted at end range.     Observation:     Test measurements:  AROM supine flexion 164 degrees, abduction  174        6 weeks post op 3/28/25    Exercises:  Per physician orders 4/10/25 able to start strengthening with 1-2# and increasing by 1-2# weekly.     Exercise/Equipment Resistance/Repetitions Other comments   Pulleys 1 min ea Flex/abd         Wall Slides 10x Flexion/scaption   Standing wand 10x  ea Flex, abd IR , ext             PROM R shoulder/elbow 12' + rhythmic stabilization         Pendulums   HEP      stress ball squeeze  HEP         Table slides F, Scap        Supine flex 1# 10x Bench, flex, ER   ABC's 1# 1x    Ceiling punch  1# 10x    Supine hand to pillow 10x    Sl'ing Abd,ER 10x ea    Sling hor abd 10x         [x] Provided verbal/tactile cueing for activities related to strengthening, flexibility, endurance, ROM. (06249)  [] Provided verbal/tactile cueing for activities related to improving balance, coordination, kinesthetic  Report to Van Zeng RN to assume care at this time. Benzoyl Peroxide Pregnancy And Lactation Text: This medication is Pregnancy Category C. It is unknown if benzoyl peroxide is excreted in breast milk.

## 2025-04-17 NOTE — FLOWSHEET NOTE
Physical Therapy Daily Treatment Note    Date:  2025    Patient Name:  Manuel Woody    :  1965  MRN: [id number]  Restrictions/Precautions:     Medical/Treatment Diagnosis Information:   Diagnosis: Z98.890 s/p scope R shoulder,  M75.21 biceps tendonitis R shoulder   Surgery 25  Insurance/Certification information:  PT Insurance Information: ANDREINA LE  Physician Information:   Taz Loya  Plan of care signed (Y/N):  N  Visit# / total visits: 8/10 2nd POC  Total: 18  Pain level: 0-8/10       Time In:1255 Time Out:  1333    Progress Note: []  Yes  [x]  No  Next due by: Visit #10  Or by 25    Subjective:  Pt reports increased soreness this am  .  Objective: RAÚL complete per [facility] chart to facilitate strength, motion and decrease tightness and pain. Verbal cueing for sequencing and proper form and to relax shoulder/decrease compensations and guarding. Initiated and advanced several exercises to improve motion and strength. Discussed  PROM performed with tightness noted at end range.     Observation:     Test measurements:  AROM supine flexion 164 degrees, abduction  174        6 weeks post op 3/28/25    Exercises:  Per physician orders 4/10/25 able to start strengthening with 1-2# and increasing by 1-2# weekly.     Exercise/Equipment Resistance/Repetitions Other comments   Pulleys 1 min ea Flex/abd         Wall Slides 10x Flexion/scaption   Standing wand 10x  ea Flex, abd IR , ext             PROM R shoulder/elbow 12' + rhythmic stabilization         Pendulums   HEP      stress ball squeeze  HEP         Table slides F, Scap        Supine flex 1# 10x Bench, flex, ER   ABC's 1# 1x    Ceiling punch  1# 10x    Supine hand to pillow 10x    Sl'ing Abd,ER 10x ea    Sling hor abd 10x         [x] Provided verbal/tactile cueing for activities related to strengthening, flexibility, endurance, ROM. (34262)  [] Provided verbal/tactile cueing for activities related to improving balance, coordination, kinesthetic

## 2025-04-22 ENCOUNTER — HOSPITAL ENCOUNTER (OUTPATIENT)
Dept: PHYSICAL THERAPY | Age: 60
Setting detail: THERAPIES SERIES
Discharge: HOME | End: 2025-04-22
Payer: COMMERCIAL

## 2025-04-22 PROCEDURE — 97110 THERAPEUTIC EXERCISES: CPT

## 2025-04-22 NOTE — FLOWSHEET NOTE
Physical Therapy Daily Treatment Note    Date:  2025    Patient Name:  Manuel Woody    :  1965  MRN: [id number]  Restrictions/Precautions:     Medical/Treatment Diagnosis Information:   Diagnosis: Z98.890 s/p scope R shoulder,  M75.21 biceps tendonitis R shoulder   Surgery 25  Insurance/Certification information:  PT Insurance Information: [facility]  Physician Information:   Taz Loya  Plan of care signed (Y/N):  N  Visit# / total visits: 9/10 2nd POC  Total: 19  Pain level: 0-810       Time In:946  Time Out:  1026    Progress Note: []  Yes  [x]  No  Next due by: Visit #10  Or by 25    Subjective:  Pt reports increased soreness with flexion. Clicking sensation in shoulder has subsided.     Objective: RAÚL complete per flo chart to facilitate strength, motion and decrease tightness and pain. Verbal cueing for sequencing and proper form and to relax shoulder/decrease compensations and guarding. Initiated and advanced several exercises to improve motion and strength. PROM performed with tightness noted at end range.     Observation:     Test measurements:  AROM supine flexion 164 degrees, abduction  174        6 weeks post op 3/28/25    Exercises:  Per physician orders 4/10/25 able to start strengthening with 1-2# and increasing by 1-2# weekly.     Exercise/Equipment Resistance/Repetitions Other comments   Pulleys 1 min ea Flex/abd         Wall Slides 15x Flexion/scaption   Standing wand 15x  ea Flex, abd IR , ext             PROM R shoulder/elbow 12' + rhythmic stabilization         Pendulums   HEP      stress ball squeeze  HEP         Table slides F, Scap        Supine flex  2# 10x Bench, flex, ER  ADV   ABC's 2# 1x ADV   Ceiling punch  2# 10x ADV   Supine hand to pillow 15x    Sl'ing Abd,ER 1# 10x ea ADV   Sling hor abd 10x    Prone scap series  Next          3 way bicep curls  10x 2#  Initiated    Tricep curls   Initiated    Standing shoulder flex/scaption  10x 1#  Initiated         [x]

## 2025-04-24 ENCOUNTER — HOSPITAL ENCOUNTER (OUTPATIENT)
Dept: PHYSICAL THERAPY | Age: 60
Setting detail: THERAPIES SERIES
Discharge: HOME | End: 2025-04-24
Payer: COMMERCIAL

## 2025-04-24 PROCEDURE — 97110 THERAPEUTIC EXERCISES: CPT | Performed by: PHYSICAL THERAPY ASSISTANT

## 2025-04-24 NOTE — FLOWSHEET NOTE
Physical Therapy Daily Treatment Note    Date:  2025    Patient Name:  Manuel Woody    :  1965  MRN: [id number]  Restrictions/Precautions:     Medical/Treatment Diagnosis Information:   Diagnosis: Z98.890 s/p scope R shoulder,  M75.21 biceps tendonitis R shoulder   Surgery 25  Insurance/Certification information:  PT Insurance Information: [place]  [facility]  Physician Information:   Taz Loya  Plan of care signed (Y/N):  N  Visit# / total visits: 10/12 2nd POC  Total: 20  Pain level: 0-8/10       Time In:1008  Time Out:  1046    Progress Note: []  Yes  [x]  No  Next due by: Visit #10  Or by 25    Subjective:  No increase in pain this date. Feels doing well overall.     Objective: RAÚL complete per [facility] chart to facilitate strength, motion and decrease tightness and pain. Verbal cueing for sequencing and proper form and to relax shoulder/decrease compensations and guarding. Initiated and advanced several exercises to improve motion and strength. PROM performed with tightness noted at end range.     Observation:     Test measurements:          6 weeks post op 3/28/25    Exercises:  Per physician orders 4/10/25 able to start strengthening with 1-2# and increasing by 1-2# weekly.     Exercise/Equipment Resistance/Repetitions Other comments   Pulleys 1 min ea Flex/abd         Wall Slides 15x Flexion/scaption   Standing wand 15x  ea Flex, abd IR , ext             PROM R shoulder/elbow 12' + rhythmic stabilization         Pendulums   HEP      stress ball squeeze  HEP         Table slides F, Scap        Supine flex  2# 10x Bench, flex, ER  ADV   ABC's 2# 1x    Ceiling punch  2# 15x ADV   Supine hand to pillow 15x    Sl'ing Abd,ER 1# 10x ea    Sling hor abd 10x    Prone scap series  10x2# 3-way        Liane Par 1x Square L6        3 way bicep curls  10x 2#     Tricep curls      Standing shoulder flex/scaption  10x 2#  ADV        [x] Provided verbal/tactile cueing for activities related to strengthening,

## 2025-04-29 ENCOUNTER — HOSPITAL ENCOUNTER (OUTPATIENT)
Dept: PHYSICAL THERAPY | Age: 60
Setting detail: THERAPIES SERIES
Discharge: HOME | End: 2025-04-29
Payer: COMMERCIAL

## 2025-04-29 PROCEDURE — 97110 THERAPEUTIC EXERCISES: CPT | Performed by: PHYSICAL THERAPY ASSISTANT

## 2025-04-29 NOTE — FLOWSHEET NOTE
verbal/tactile cueing for activities related to strengthening, flexibility, endurance, ROM. (87093)  [] Provided verbal/tactile cueing for activities related to improving balance, coordination, kinesthetic sense, posture, motor skill, proprioception. (96637)    Therapeutic Activities:     [] Therapeutic activities, direct (one-on-one) patient contact (use of dynamic activities to improve functional performance). (08301)    Gait:   [] Provided training and instruction to the patient for ambulation re-education. (74455)    Self-Care/ADL's  [] Self-care/home management training and compensatory training, meal preparation, safety procedures, and instructions in use of assistive technology devices/adaptive equipment, direct one-on-one contact. (07534)    Home Exercise Program:   [x] Reviewed/Progressed HEP activities related to strengthening, flexibility, endurance, ROM. (52663)  [] Reviewed/Progressed HEP activities related to improving balance, coordination, kinesthetic sense, posture, motor skill, proprioception.  (28334)    Manual Treatments:    [] Provided manual therapy to mobilize soft tissue/joints for the purpose of modulating pain, promoting relaxation,  increasing ROM, reducing/eliminating soft tissue swelling/inflammation/restriction, improving soft tissue extensibility. (83424)    Service Based Modalities:      Timed Code Treatment Minutes: 43 there ex      Total Treatment Minutes:  43'    Treatment/Activity Tolerance:  [x] Patient tolerated treatment well [] Patient limited by fatique  [] Patient limited by pain  [] Patient limited by other medical complications  [] Other:     Prognosis: [x] Good [] Fair  [] Poor    Patient Requires Follow-up: [x] Yes  [] No    Goals:  Short Term Goals  Time Frame for Short Term Goals: 1 week  Short Term Goal 1: Initiate HEP Provided    Long Term Goals  Time Frame for Long Term Goals : 8 weeks  Long Term Goal 1: Independent in HEP states compliance  Long Term Goal 2: Pt

## 2025-05-01 ENCOUNTER — HOSPITAL ENCOUNTER (OUTPATIENT)
Dept: PHYSICAL THERAPY | Age: 60
Setting detail: THERAPIES SERIES
Discharge: HOME | End: 2025-05-01
Payer: COMMERCIAL

## 2025-05-01 PROCEDURE — 97110 THERAPEUTIC EXERCISES: CPT

## 2025-05-01 NOTE — FLOWSHEET NOTE
Physical Therapy Daily Treatment Note    Date:  2025    Patient Name:  Manuel Woody    :  1965  MRN: [id number]  Restrictions/Precautions:     Medical/Treatment Diagnosis Information:   Diagnosis: Z98.890 s/p scope R shoulder,  M75.21 biceps tendonitis R shoulder   Surgery 25  Insurance/Certification information:  PT Insurance Information: [place]  [employer]RE  Physician Information:   Taz Loya  Plan of care signed (Y/N):  N  Visit# / total visits:  2nd POC  Total: 22  Pain level: 0-8/10       Time In: 9:30  Time Out:  1013    Progress Note: [x]  Yes  []  No  Next due by: Visit #10  Or by 25    Subjective:  Pt reports shoulder is still lacking strength and has pain in most motions with weight. Would note 25% improvement since beginning PT. Would like to continue to work towards strength as requires a lot of heavy lifting with work.  Unsure which date sees  Next however knows it is coming up.     Objective: RAÚL complete per flow chart to facilitate strength, motion and decrease tightness and pain. Verbal cueing for sequencing and proper form and to relax shoulder/decrease compensations and guarding. Initiated and advanced several exercises to improve motion and strength. PROM performed with tightness noted at end range.     Observation: Fatigue with progression and prolonged strengthening exercises.     Test measurements:      Standing AROM// R shoulder:                                 flex- 150 deg // 4-/5                                  Abd- 125 deg// 3+/5                                    IR- L5 // 4/5                                   ER- C6  // 3+/5    UEFI: 34/80      10 weeks post op 25    Exercises:  Per physician orders 4/10/25 able to start strengthening with 1-2# and increasing by 1-2# weekly.     Exercise/Equipment Resistance/Repetitions Other comments   Pulleys 2.5 min ea Flex/abd         Wall Slides 15x Flexion/scaption   Standing wand 15x  ea Flex, abd IR , ext

## 2025-05-06 ENCOUNTER — HOSPITAL ENCOUNTER (OUTPATIENT)
Dept: PHYSICAL THERAPY | Age: 60
Setting detail: THERAPIES SERIES
Discharge: HOME | End: 2025-05-06
Payer: COMMERCIAL

## 2025-05-06 PROCEDURE — 97110 THERAPEUTIC EXERCISES: CPT | Performed by: PHYSICAL THERAPY ASSISTANT

## 2025-05-06 NOTE — FLOWSHEET NOTE
Physical Therapy Daily Treatment Note    Date:  2025    Patient Name:  Manuel Woody    :  1965  MRN: [id number]  Restrictions/Precautions:     Medical/Treatment Diagnosis Information:   Diagnosis: Z98.890 s/p scope R shoulder,  M75.21 biceps tendonitis R shoulder   Surgery 25  Insurance/Certification information:  PT Insurance Information: [place]  [employer]DEANDRE  Physician Information:   Taz Loya  Plan of care signed (Y/N):  N  Visit# / total visits:  POC  Total: 23  Pain level: /10       Time In: 1001  Time Out: 1045    Progress Note: []  Yes  [x]  No  Next due by: Visit #10  Or by 25    Subjective:  Minimal pain noted overall. Difficulty and discomfort with reaching remains.    Objective: RAÚL complete per flow chart to facilitate strength, motion and decrease tightness and pain. Verbal cueing for sequencing and proper form and to relax shoulder/decrease compensations and guarding. Progressed and advanced several exercises to improve motion and strength. PROM performed with tightness noted at end range.     Observation: Fatigue with progression and prolonged strengthening exercises.     Test measurements:            10 weeks post op 25    Exercises:  Per physician orders 4/10/25 able to start strengthening with 1-2# and increasing by 1-2# weekly.     Exercise/Equipment Resistance/Repetitions Other comments   Pulleys 2.5 min ea Flex/abd         Wall Slides 10x Flexion/scaption   Standing wand 15x  ea Flex, abd IR , ext             PROM R shoulder/elbow 12' + rhythmic stabilization         Pendulums   HEP      stress ball squeeze  HEP         Table slides F, Scap        Supine flex  2# 10x Bench, flex, ER     ABC's 4# 1x ADV   Ceiling punch , circles 4# 10x ADV   Supine hand to pillow 10x3#    Sl'ing Abd,ER 2# 15x ea ADV   Sling hor abd 10x    Prone scap series  15x2# 3-way  ADV        Liane Par 1x [employer] L6        3 way bicep curls  10x 4#     Tricep curls      Standing shoulder flex/scaption

## 2025-05-08 ENCOUNTER — HOSPITAL ENCOUNTER (OUTPATIENT)
Dept: PHYSICAL THERAPY | Age: 60
Setting detail: THERAPIES SERIES
Discharge: HOME | End: 2025-05-08
Payer: COMMERCIAL

## 2025-05-08 PROCEDURE — 97110 THERAPEUTIC EXERCISES: CPT

## 2025-05-08 NOTE — FLOWSHEET NOTE
Physical Therapy Daily Treatment Note    Date:  2025    Patient Name:  Manuel Woody    :  1965  MRN: [id number]  Restrictions/Precautions:     Medical/Treatment Diagnosis Information:   Diagnosis: Z98.890 s/p scope R shoulder,  M75.21 biceps tendonitis R shoulder   Surgery 25  Insurance/Certification information:  PT Insurance Information: [place]  BC  Physician Information:   Taz Loya  Plan of care signed (Y/N):  N  Visit# / total visits:  2nd POC  Total: 24  Pain level: /10       Time In: 1013   Time Out: 10:59    Progress Note: []  Yes  [x]  No  Next due by: Visit #10  Or by 25    Subjective:  no pain at rest, only when reaching certain directions. Can now do normal ADLs.    Objective: RAÚL complete per flow chart to facilitate strength, motion and decrease tightness and pain. Verbal cueing for sequencing and proper form and to relax shoulder/decrease compensations and guarding. Progressed and advanced several exercises to improve motion and strength. PROM performed with tightness noted at end range.     Observation: Fatigue with progression and prolonged strengthening exercises.     Test measurements: AROM R shoulder IR: L4                                                                 ER: C7     10 weeks post op 25    Exercises:  Per physician orders 4/10/25 able to start strengthening with 1-2# and increasing by 1-2# weekly.     Exercise/Equipment Resistance/Repetitions Other comments   Pulleys  Flex/abd         Wall Slides 10x Flexion/scaption   Standing wand 15x  ea Flex, abd IR , ext             PROM R shoulder/elbow 12' + rhythmic stabilization         Pendulums   HEP      stress ball squeeze  HEP         Table slides F, Scap   TB rows/ext 10x green     6 way TB 10x red    Supine flex  2# 15x Bench, flex, ER     ABC's 4# 1x    Ceiling punch , circles 4# 15x ADV   Supine hand to pillow 10x3#    Sl'ing Abd,ER 3# 10x ea ADV   Sling hor abd 10x    Prone scap series  15x3# 3-way

## 2025-05-13 ENCOUNTER — HOSPITAL ENCOUNTER (OUTPATIENT)
Dept: PHYSICAL THERAPY | Age: 60
Setting detail: THERAPIES SERIES
Discharge: HOME | End: 2025-05-13
Payer: COMMERCIAL

## 2025-05-13 PROCEDURE — 97110 THERAPEUTIC EXERCISES: CPT | Performed by: PHYSICAL THERAPIST

## 2025-05-13 NOTE — FLOWSHEET NOTE
Physical Therapy Daily Treatment Note    Date:  2025    Patient Name:  Manuel Woody    :  1965  MRN: [id number]  Restrictions/Precautions:     Medical/Treatment Diagnosis Information:   Diagnosis: Z98.890 s/p scope R shoulder,  M75.21 biceps tendonitis R shoulder   Surgery 25  Insurance/Certification information:  PT Insurance Information: ANDREINA LE  Physician Information:   Taz Loya  Plan of care signed (Y/N):  N  Visit# / total visits: 3/12 2nd POC  Total: 25  Pain level: 2/10       Time In: 9:45  Time Out: 10:35    Progress Note: []  Yes  [x]  No  Next due by: Visit #10  Or by 25    Subjective:  Pt reports  pleased with progress feels he is on target  currently.  Pt can turn fawcet on and lift plate without increased pain, lying on side lifting sheet will increase pain    Objective: RAÚL complete per flow chart to facilitate strength, motion and decrease tightness and pain. Verbal cueing for sequencing and proper form and to relax shoulder/decrease compensations and guarding. Progressed and advanced several exercises to improve motion and strength.     Observation: Fatigue with progression of strengthening exercises. Pt stressed to  ice at home    Test measurements: mild end range stretch in flexion and  IR  10 weeks post op 25    Exercises:  Per physician orders 25 increase strengthening  2# weekly.     Exercise/Equipment Resistance/Repetitions Other comments   Pulleys  Flex/abd         Wall Slides 10x Flexion/scaption   Standing wand 15x  ea Flex, abd IR , ext   Baps balls on wall turns 1-5 1x ea    MB drop and catch 2#  in horizontal abd arch  5x    PROM R shoulder/elbow 12' + rhythmic stabilization         Pendulums   HEP      stress ball squeeze  HEP         Table slides F, Scap   TB rows/ext 15x green     6 way TB 15x red    Supine flex  4# 10x Bench, flex, ER     ABC's 5# 1x    Ceiling punch , circles 5# 10x ADV 1#   Supine hand to pillow 10x3#    Sl'ing Abd,ER 4# 10x ea

## 2025-05-15 ENCOUNTER — HOSPITAL ENCOUNTER (OUTPATIENT)
Dept: PHYSICAL THERAPY | Age: 60
Setting detail: THERAPIES SERIES
Discharge: HOME | End: 2025-05-15
Payer: COMMERCIAL

## 2025-05-15 PROCEDURE — 97110 THERAPEUTIC EXERCISES: CPT

## 2025-05-15 NOTE — FLOWSHEET NOTE
Physical Therapy Daily Treatment Note    Date:  5/15/2025    Patient Name:  Manuel Woody    :  1965  MRN: [id number]  Restrictions/Precautions:     Medical/Treatment Diagnosis Information:   Diagnosis: Z98.890 s/p scope R shoulder,  M75.21 biceps tendonitis R shoulder   Surgery 25  Insurance/Certification information:  PT Insurance Information: ANDREINA LE  Physician Information:   Taz Loya  Plan of care signed (Y/N):  N  Visit# / total visits:  2nd POC  Total: 26  Pain level: 2/10       Time In: 9:46   Time Out: 10:30    Progress Note: []  Yes  [x]  No  Next due by: Visit #10  Or by 25    Subjective:  Pt reports min soreness following last session. Abduction and lifting bed sheet will increase pain.     Objective: RAÚL complete per flow chart to facilitate strength, motion and decrease tightness and pain. Verbal cueing for sequencing and proper form and to relax shoulder/decrease compensations and guarding. Progressed and advanced several exercises to improve motion and strength. Held manual stretching and multiple exercises this date d/t timing this date. Plan to re-add next visit.     Observation: Fatigue with progression of strengthening exercises. Pt stressed to  ice at home    Test measurements: mild end range stretch in flexion and  IR  10 weeks post op 25    Exercises:  Per physician orders 25 increase strengthening  2# weekly.     Exercise/Equipment Resistance/Repetitions Other comments   Pulleys  Flex/abd         Wall Slides 10x Flexion/scaption   Standing wand 15x  ea Flex, abd IR , ext   Baps balls on wall turns 1-5 Held d/t time    MB drop and catch 2#  in horizontal abd arch  5x    PROM R shoulder/elbow Held d/t time, re-add next session         Pendulums   HEP      stress ball squeeze  HEP         Table slides F, Scap   TB rows/ext 20x green     6 way TB 15x red    Supine flex  4# 10x Bench, flex, ER     ABC's 5# 1x    Ceiling punch , circles 5# 10x ADV 1#   Supine hand to

## 2025-05-20 ENCOUNTER — HOSPITAL ENCOUNTER (OUTPATIENT)
Dept: PHYSICAL THERAPY | Age: 60
Setting detail: THERAPIES SERIES
Discharge: HOME | End: 2025-05-20
Payer: COMMERCIAL

## 2025-05-20 PROCEDURE — 97110 THERAPEUTIC EXERCISES: CPT | Performed by: PHYSICAL THERAPIST

## 2025-05-20 NOTE — FLOWSHEET NOTE
Physical Therapy Daily Treatment Note    Date:  2025    Patient Name:  Manuel Woody    :  1965  MRN: [id number]  Restrictions/Precautions:     Medical/Treatment Diagnosis Information:   Diagnosis: Z98.890 s/p scope R shoulder,  M75.21 biceps tendonitis R shoulder   Surgery 25  Insurance/Certification information:  PT Insurance Information: ANDREINA LE  Physician Information:   Taz Loya  Plan of care signed (Y/N):  N  Visit# / total visits: 2nd POC  Total: 27  Pain level: 5/10       Time In:10:08   Time Out: 10:54    Progress Note: []  Yes  [x]  No  Next due by: Visit #10  Or by 25    Subjective:  Pt enters with soreness thinks might have slept on it wrong.     Objective: RAÚL complete per flow chart to facilitate strength, motion and decrease tightness and pain. Verbal cueing for sequencing and proper form and to relax shoulder/decrease compensations and guarding. After stretching back to baseline   Observation: Fatigue with progression of strengthening exercises. No strengthening additions this date moderate fatigue with session this date    Test measurements: shoulder flexion supine 172, abduction 174 degrees ER 90, IR 82  10 weeks post op 25    Exercises:  Per physician orders 25 increase strengthening  2# weekly.     Exercise/Equipment Resistance/Repetitions Other comments   Pulleys 1 min ea Flex/abd         Wall Slides 10x Flexion/scaption   Standing wand 15x  ea Flex, abd IR , ext   Baps balls on wall turns 1-5 Held    MB drop and catch 2#  in horizontal abd arch  5x    PROM R shoulder/elbow 6' + rhythmic stabilization         Pendulums   HEP      stress ball squeeze  HEP             TB rows/ext 20x green     6 way TB 15x red    Supine flex  4# 10x Bench, flex, ER     ABC's 5# 1x    Ceiling punch , circles 5# 10x ADV 1#   Supine hand to pillow 10x3#    Sl'ing Abd,ER 4# 10x ea ADV 1#   Sling hor abd 10x    Prone scap series  3-way  ADV held due to time and fatigue        Liane Par

## 2025-05-22 ENCOUNTER — HOSPITAL ENCOUNTER (OUTPATIENT)
Dept: PHYSICAL THERAPY | Age: 60
Setting detail: THERAPIES SERIES
Discharge: HOME | End: 2025-05-22
Payer: COMMERCIAL

## 2025-05-22 PROCEDURE — 97110 THERAPEUTIC EXERCISES: CPT

## 2025-05-22 NOTE — FLOWSHEET NOTE
Physical Therapy Daily Treatment Note    Date:  2025    Patient Name:  Manuel Woody    :  1965  MRN: [id number]  Restrictions/Precautions:     Medical/Treatment Diagnosis Information:   Diagnosis: Z98.890 s/p scope R shoulder,  M75.21 biceps tendonitis R shoulder   Surgery 25  Insurance/Certification information:  PT Insurance Information: ANDREINA LE  Physician Information:   Taz Loya  Plan of care signed (Y/N):  N  Visit# / total visits: POC  Total: 28  Pain level: 5/10       Time In:10:20   Time Out: 11:00    Progress Note: []  Yes  [x]  No  Next due by: Visit #10  Or by 25    Subjective:  5 minutes late this date. Was really sore after last session and still has some residual soreness since manual stretching.     RTD: 25    Objective: RAÚL complete per flow chart to facilitate strength, motion and decrease tightness and pain. Verbal cueing for sequencing and proper form and to relax shoulder/decrease compensations and guarding. After stretching back to baseline     Observation: Fatigue with progression of strengthening exercises. No strengthening additions this date moderate fatigue with session this date    Test measurements:   13 weeks post op 25    Exercises:  Per physician orders 25 increase strengthening  2# weekly.     Exercise/Equipment Resistance/Repetitions Other comments   Pulleys  Flex/abd         Wall Slides 10x Flexion/scaption   Standing wand 15x  ea Flex, abd IR , ext   Baps balls on wall turns 1-5 Held    MB drop and catch 2#  in horizontal abd arch  5x    PROM R shoulder/elbow 5' + rhythmic stabilization         Pendulums   HEP      stress ball squeeze  HEP             TB rows/ext 10x blue     6 way TB 10x green    Supine flex  4# 10x Bench, flex, ER     ABC's 5# 1x    Ceiling punch , circles 5# 10x    Supine hand to pillow 10x3#    Sl'ing Abd,ER 4# 15x ea    Sling hor abd 10x    Prone scap series  3-way  ADV held due to time and fatigue        Liane Par

## 2025-05-27 ENCOUNTER — HOSPITAL ENCOUNTER (OUTPATIENT)
Dept: PHYSICAL THERAPY | Age: 60
Setting detail: THERAPIES SERIES
Discharge: HOME | End: 2025-05-27
Payer: COMMERCIAL

## 2025-05-27 PROCEDURE — 97110 THERAPEUTIC EXERCISES: CPT

## 2025-05-27 NOTE — FLOWSHEET NOTE
Follow-up: [x] Yes  [] No    Goals:  Short Term Goals  Time Frame for Short Term Goals: 1 week  Short Term Goal 1: Initiate HEP Provided    Long Term Goals  Time Frame for Long Term Goals : 8 weeks  Long Term Goal 1: Independent in HEP states compliance  Long Term Goal 2: Pt will demo R shoulder functional ROM for ease with overhead and behind back following protocol and dr orders see above  Long Term Goal 3: Pt will demo R shoulder ./ elbow strength 4+/5 for RTPLOF following orders and protocol (see above  Long Term Goal 4: Pt willl demo good resting posture able  Long Term Goal 5: Pt will  score 65+/80 on UEFI for improved QOL see above    Plan:   [x] Continue per plan of care [] Alter current plan (see comments)  [] Plan of care initiated [] Hold pending MD visit [] Discharge    Plan for Next Session:  Continue to advance strength and stability as able.     Electronically signed by:  ALDA BRADLEY PTA

## 2025-05-29 ENCOUNTER — HOSPITAL ENCOUNTER (OUTPATIENT)
Dept: PHYSICAL THERAPY | Age: 60
Setting detail: THERAPIES SERIES
Discharge: HOME | End: 2025-05-29
Payer: COMMERCIAL

## 2025-05-29 PROCEDURE — 97110 THERAPEUTIC EXERCISES: CPT | Performed by: PHYSICAL THERAPY ASSISTANT

## 2025-05-29 NOTE — FLOWSHEET NOTE
Physical Therapy Daily Treatment Note    Date:  2025    Patient Name:  Manuel Woody    :  1965  MRN: [id number]  Restrictions/Precautions:     Medical/Treatment Diagnosis Information:   Diagnosis: Z98.890 s/p scope R shoulder,  M75.21 biceps tendonitis R shoulder   Surgery 25  Insurance/Certification information:  PT Insurance Information: [place]  BCDEANDRE  Physician Information:   Taz Loya  Plan of care signed (Y/N):  N  Visit# / total visits: 2nd POC  Total: 29  Pain level: 0/10       Time In:1134  Time Out: 1221    Progress Note: []  Yes  [x]  No  Next due by: Visit #10  Or by 25    Subjective: Pt reports shoulder is doing okay this date, pain rated 0/10. Notes catching sensation with reaching out to the side.     RTD: 25    Objective: RAÚL complete per flow chart to facilitate strength, motion and decrease tightness and pain. Verbal cueing for sequencing and proper form and to relax shoulder/decrease compensations and guarding. PROM performed, tight end feel with flexion noted.     Observation: Fatigue with progression of strengthening exercises. Able to progress several strengthening additions this date moderate fatigue following.     Test measurements:   13 weeks post op 25    Exercises:  Per physician orders 25 increase strengthening  2# weekly.     Exercise/Equipment Resistance/Repetitions Other comments   Pulleys  Flex/abd         Wall Slides 10x Flexion/scaption   Standing wand 15x  ea Flex, abd IR , ext   Baps balls on wall turns 1-5 Held    MB drop and catch    PROM R shoulder/elbow 5' + rhythmic stabilization                  TB rows/ext 15x blue  \   6 way TB 15x green    Supine flex  4# 15x Bench, flex, ER     ABC's 5# 1x    Ceiling punch , circles 5# 15x    Supine hand to pillow 15x5#    Sl'ing Abd,ER 4# 10x  Unable to complete abd   Sling hor abd 10x    Prone scap series  15x3# 3-way          Liane Par 1x Triangle/square   on high one low L6   Body blade  3' Held due

## 2025-06-03 ENCOUNTER — HOSPITAL ENCOUNTER (OUTPATIENT)
Dept: PHYSICAL THERAPY | Age: 60
Setting detail: THERAPIES SERIES
Discharge: HOME | End: 2025-06-03
Payer: COMMERCIAL

## 2025-06-03 PROCEDURE — 97140 MANUAL THERAPY 1/> REGIONS: CPT | Performed by: PHYSICAL THERAPIST

## 2025-06-03 PROCEDURE — 97110 THERAPEUTIC EXERCISES: CPT | Performed by: PHYSICAL THERAPIST

## 2025-06-03 NOTE — FLOWSHEET NOTE
IASTM/cupping     Total Treatment Minutes:  38'    Treatment/Activity Tolerance:  [x] Patient tolerated treatment well [] Patient limited by fatique  [] Patient limited by pain  [] Patient limited by other medical complications  [] Other:     Prognosis: [x] Good [] Fair  [] Poor    Patient Requires Follow-up: [x] Yes  [] No    Goals:  Short Term Goals  Time Frame for Short Term Goals: 1 week  Short Term Goal 1: Initiate HEP Provided    Long Term Goals  Time Frame for Long Term Goals : 8 weeks  Long Term Goal 1: Independent in HEP states compliance  Long Term Goal 2: Pt will demo R shoulder functional ROM for ease with overhead and behind back following protocol and dr orders see above  Long Term Goal 3: Pt will demo R shoulder ./ elbow strength 4+/5 for RTPLOF following orders and protocol (see above  Long Term Goal 4: Pt willl demo good resting posture able  Long Term Goal 5: Pt will  score 65+/80 on UEFI for improved QOL see above    Plan:   [x] Continue per plan of care [] Alter current plan (see comments)  [] Plan of care initiated [] Hold pending MD visit [] Discharge    Plan for Next Session:  Continue to advance strength and stability as able.     Electronically signed by:  Adilene Goncalves, PT

## 2025-06-05 ENCOUNTER — APPOINTMENT (OUTPATIENT)
Dept: PHYSICAL THERAPY | Age: 60
End: 2025-06-05
Payer: COMMERCIAL